# Patient Record
Sex: FEMALE | Race: WHITE | Employment: UNEMPLOYED | URBAN - METROPOLITAN AREA
[De-identification: names, ages, dates, MRNs, and addresses within clinical notes are randomized per-mention and may not be internally consistent; named-entity substitution may affect disease eponyms.]

---

## 2020-04-08 ENCOUNTER — VIRTUAL VISIT (OUTPATIENT)
Dept: ENDOCRINOLOGY | Age: 40
End: 2020-04-08
Payer: COMMERCIAL

## 2020-04-08 PROBLEM — F32.0 CURRENT MILD EPISODE OF MAJOR DEPRESSIVE DISORDER WITHOUT PRIOR EPISODE (HCC): Status: ACTIVE | Noted: 2020-04-08

## 2020-04-08 PROBLEM — J45.909 ACTIVE ASTHMA: Status: ACTIVE | Noted: 2020-04-08

## 2020-04-08 PROBLEM — E06.3 HYPOTHYROIDISM DUE TO HASHIMOTO'S THYROIDITIS: Status: ACTIVE | Noted: 2020-04-08

## 2020-04-08 PROBLEM — E27.1 ADDISON DISEASE (HCC): Status: ACTIVE | Noted: 2020-04-08

## 2020-04-08 PROBLEM — E03.8 HYPOTHYROIDISM DUE TO HASHIMOTO'S THYROIDITIS: Status: ACTIVE | Noted: 2020-04-08

## 2020-04-08 PROCEDURE — G8427 DOCREV CUR MEDS BY ELIG CLIN: HCPCS | Performed by: INTERNAL MEDICINE

## 2020-04-08 PROCEDURE — 99203 OFFICE O/P NEW LOW 30 MIN: CPT | Performed by: INTERNAL MEDICINE

## 2020-04-08 RX ORDER — HYDROCORTISONE 5 MG/1
TABLET ORAL
Qty: 150 TABLET | Refills: 3 | Status: SHIPPED | OUTPATIENT
Start: 2020-04-08 | End: 2021-04-30

## 2020-04-08 RX ORDER — FLUDROCORTISONE ACETATE 0.1 MG/1
TABLET ORAL
Qty: 60 TABLET | Refills: 11 | Status: SHIPPED | OUTPATIENT
Start: 2020-04-08 | End: 2020-04-10 | Stop reason: SDUPTHER

## 2020-04-08 RX ORDER — LEVOTHYROXINE SODIUM 0.1 MG/1
100 TABLET ORAL DAILY
Qty: 30 TABLET | Refills: 2 | Status: SHIPPED | OUTPATIENT
Start: 2020-04-08 | End: 2020-10-16

## 2020-04-08 NOTE — PROGRESS NOTES
SUBJECTIVE:  Rory Verma is a 42 y.o. female who is evaluated today in my clinic for Addisons Disease and Hypothyroidism. Previously she has seen Dr Martin Castro and [address] [place] [address] at [facility] clinic. 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 At the time of diagnoses she presented with extreme fatigue, weight loss, and skin darkening. The symptoms started with her first pregnancy although she was not started on any steroids. After her delivery she was diagnosed with [place]'s and started on Hydrocortisone. She noted improvement in her fatigue and darkening of the skin and gums. she has been taking HC 15 mg in am and 5 mg in PM .  Previously became cushingoid on higher doses of steroids . Denies any low BP   She was diagnosed with hypothyroidism 1 year after diagnosis of [place]'s. She is on 100 micrograms of synthroid for the last 2-3 months as she was told by her PCP that her TSH was elevated. There is no family history of adrenal issues, but her mom and brother have type 2 DM and daughter has type 1 DM. Pt also has Asthma for which she is on inhalers and this has been stable. Pt also has depression and is on medication     Past Medical History:   Diagnosis Date    [place] disease ([address]     Asthma     MRSA (methicillin resistant staph aureus) culture positive 09/21/2018    eye     Patient Active Problem List    Diagnosis Date Noted    [place] disease ([address] 04/08/2020    Hypothyroidism due to Hashimoto's thyroiditis 04/08/2020    Current mild episode of major depressive disorder without prior episode ([facility] [address] 04/08/2020    Active asthma 04/08/2020     History reviewed. No pertinent surgical history.   Family History   Problem Relation Age of Onset    Diabetes type 2  Mother     Cancer Brother     Diabetes Type 1  Daughter      Social History     Socioeconomic History    Marital status: Legally      Spouse name: None    Number of children: None    Years of education: None Component Value Date    T4FREE 0.75 12/04/2009        ASSESSMENT/PLAN:  1. Laura disease ([address]    She has been taking 15 mg of HC in AM and 5 mg in Pm   Also takes Florinef 0.1 milligrams daily   I had a lengthy discussion with the patient regarding stress dosing of steroids. Araceli Dale was advised that during minor illnesses, such as upper respiratory infections, the patient can increase the dose of glucocorticoid to two to three times the usual daily dose for three days without consulting a clinician (known as the 3 x 3 rule). The increased dose will decrease fever and malaise and will not compromise the immune response. If the illness becomes worse during the three days or if the patient cannot resume the usual maintenance dose on the fourth day, he or she should consult a clinician to determine if other treatment (eg, antibiotics) is indicated. Also advised to always wear a medical alert bracelet. - Miscellaneous Sendout 1; Future  - TSH without Reflex; Future  - T4, Free; Future  - Anti-Thyroglobulin Antibody; Future  - Thyroid Peroxidase Antibody; Future  - ACTH; Future  - Comprehensive Metabolic Panel; Future  - DHEA-Sulfate; Future  - Renin; Future    2. Hypothyroidism due to Hashimoto's thyroiditis  She has increased her dose to 100 mcg daily after she was told her TSH was elevated by her PCP. Araceli Dale is currently biochemicaly  and clinically euthyroid on the  current dose of thyroid hormone replacement . Patient  is advised to take her thyroid hormone supplement on empty stomach without any concomitant Iron or calcium supplement . I have ordered thyroid function test as well as thyroid antibodies. 3. Current mild episode of major depressive disorder without prior episode (HCC)  Stable on medication.       4. Active asthma  On Dulera       Reviewed and/or ordered clinical lab results Yes  Reviewed and/or ordered radiology tests Yes   Reviewed and/or ordered

## 2020-04-09 ENCOUNTER — TELEPHONE (OUTPATIENT)
Dept: ENDOCRINOLOGY | Age: 40
End: 2020-04-09

## 2020-04-10 PROBLEM — E27.1 ADDISON DISEASE (HCC): Status: RESOLVED | Noted: 2020-04-08 | Resolved: 2020-04-10

## 2020-04-10 RX ORDER — FLUDROCORTISONE ACETATE 0.1 MG/1
TABLET ORAL
Qty: 30 TABLET | Refills: 11 | Status: SHIPPED | OUTPATIENT
Start: 2020-04-10 | End: 2021-04-30 | Stop reason: SDUPTHER

## 2020-10-16 RX ORDER — LEVOTHYROXINE SODIUM 0.1 MG/1
TABLET ORAL
Qty: 30 TABLET | Refills: 0 | Status: SHIPPED | OUTPATIENT
Start: 2020-10-16 | End: 2021-04-30 | Stop reason: SDUPTHER

## 2021-04-30 DIAGNOSIS — E06.3 HYPOTHYROIDISM DUE TO HASHIMOTO'S THYROIDITIS: ICD-10-CM

## 2021-04-30 DIAGNOSIS — E03.8 HYPOTHYROIDISM DUE TO HASHIMOTO'S THYROIDITIS: ICD-10-CM

## 2021-04-30 DIAGNOSIS — E27.1 ADDISON DISEASE (HCC): Primary | ICD-10-CM

## 2021-04-30 RX ORDER — HYDROCORTISONE 5 MG/1
TABLET ORAL
Qty: 150 TABLET | Refills: 1 | Status: SHIPPED | OUTPATIENT
Start: 2021-04-30 | End: 2021-05-24 | Stop reason: SDUPTHER

## 2021-04-30 RX ORDER — LEVOTHYROXINE SODIUM 0.1 MG/1
TABLET ORAL
Qty: 30 TABLET | Refills: 0 | Status: SHIPPED | OUTPATIENT
Start: 2021-04-30 | End: 2021-05-24 | Stop reason: SDUPTHER

## 2021-04-30 RX ORDER — FLUDROCORTISONE ACETATE 0.1 MG/1
TABLET ORAL
Qty: 30 TABLET | Refills: 0 | Status: SHIPPED | OUTPATIENT
Start: 2021-04-30 | End: 2021-05-24 | Stop reason: SDUPTHER

## 2021-04-30 NOTE — TELEPHONE ENCOUNTER
Pt calling to make sure we rec'd her refill request for Levothyroxine, Hydrocortisone, Fludrocortisone 0.1mg?  Send to [facility] on [address]  4-8-20  FOV   5-24-21

## 2021-05-24 ENCOUNTER — VIRTUAL VISIT (OUTPATIENT)
Dept: ENDOCRINOLOGY | Age: 41
End: 2021-05-24
Payer: COMMERCIAL

## 2021-05-24 DIAGNOSIS — E27.1 ADDISON DISEASE (HCC): Primary | ICD-10-CM

## 2021-05-24 DIAGNOSIS — J45.909 ACTIVE ASTHMA: ICD-10-CM

## 2021-05-24 DIAGNOSIS — E03.8 HYPOTHYROIDISM DUE TO HASHIMOTO'S THYROIDITIS: ICD-10-CM

## 2021-05-24 DIAGNOSIS — E06.3 HYPOTHYROIDISM DUE TO HASHIMOTO'S THYROIDITIS: ICD-10-CM

## 2021-05-24 PROCEDURE — G8427 DOCREV CUR MEDS BY ELIG CLIN: HCPCS | Performed by: INTERNAL MEDICINE

## 2021-05-24 PROCEDURE — 99213 OFFICE O/P EST LOW 20 MIN: CPT | Performed by: INTERNAL MEDICINE

## 2021-05-24 RX ORDER — LEVOTHYROXINE SODIUM 0.1 MG/1
TABLET ORAL
Qty: 30 TABLET | Refills: 3 | Status: SHIPPED | OUTPATIENT
Start: 2021-05-24 | End: 2021-09-17

## 2021-05-24 RX ORDER — FLUDROCORTISONE ACETATE 0.1 MG/1
TABLET ORAL
Qty: 30 TABLET | Refills: 3 | Status: SHIPPED | OUTPATIENT
Start: 2021-05-24 | End: 2021-11-18

## 2021-05-24 RX ORDER — HYDROCORTISONE 5 MG/1
TABLET ORAL
Qty: 150 TABLET | Refills: 2 | Status: SHIPPED | OUTPATIENT
Start: 2021-05-24 | End: 2021-11-12

## 2021-05-24 NOTE — PROGRESS NOTES
SUBJECTIVE:  Juan Sharpe is a 47 y.o. female who is seen for Addisons Disease and Hypothyroidism. Previously she has seen Dr Noa Willis and [address] [place] [address] at [facility] clinic. 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 At the time of diagnoses she presented with extreme fatigue, weight loss, and skin darkening. The symptoms started with her first pregnancy although she was not started on any steroids. After her delivery she was diagnosed with Anil's and started on Hydrocortisone. She noted improvement in her fatigue and darkening of the skin and gums. she has been taking HC 15 mg in am and 5 mg in PM .  Previously became cushingoid on higher doses of steroids . Denies any low BP   She was diagnosed with hypothyroidism 1 year after diagnosis of [place]'s. She is on 100 micrograms of synthroid for the last 2-3 months as she was told by her PCP that her TSH was elevated. There is no family history of adrenal issues, but her mom and brother have type 2 DM and daughter has type 1 DM. Pt also has Asthma for which she is on inhalers and this has been stable. Pt also has depression and is on medication     INTERIM   Denies any changes in her symptoms   She has gained weight since last visit   Denies any changes in skin color   She denies any Nausea , vomiting or abdominal pain     Past Medical History:   Diagnosis Date    [place] disease ([address]     Asthma     MRSA (methicillin resistant staph aureus) culture positive 09/21/2018    eye     Patient Active Problem List    Diagnosis Date Noted    Hypothyroidism due to Hashimoto's thyroiditis 04/08/2020    Current mild episode of major depressive disorder without prior episode ([address] 04/08/2020    Active asthma 04/08/2020     History reviewed. No pertinent surgical history.   Family History   Problem Relation Age of Onset    Diabetes type 2  Mother     Cancer Brother     Diabetes Type 1  Daughter      Social History     Socioeconomic History  Marital status: Legally      Spouse name: None    Number of children: None    Years of education: None    Highest education level: None   Occupational History    None   Tobacco Use    Smoking status: Former Smoker     Packs/day: 1.00     Years: 10.00     Pack years: 10.00     Types: Cigarettes     Quit date: 2010     Years since quittin.1    Smokeless tobacco: Never Used   Substance and Sexual Activity    Alcohol use: No    Drug use: No    Sexual activity: Yes     Partners: Male   Other Topics Concern    None   Social History Narrative    None     Social Determinants of Health     Financial Resource Strain:     Difficulty of Paying Living Expenses:    Food Insecurity:     Worried About Running Out of Food in the Last Year:     Ran Out of Food in the Last Year:    Transportation Needs:     Lack of Transportation (Medical):  Lack of Transportation (Non-Medical):    Physical Activity:     Days of Exercise per Week:     Minutes of Exercise per Session:    Stress:     Feeling of Stress :    Social Connections:     Frequency of Communication with Friends and Family:     Frequency of Social Gatherings with Friends and Family:     Attends Rastafari Services:     Active Member of Clubs or Organizations:     Attends Club or Organization Meetings:     Marital Status:    Intimate Partner Violence:     Fear of Current or Ex-Partner:     Emotionally Abused:     Physically Abused:     Sexually Abused:      Current Outpatient Medications   Medication Sig Dispense Refill    hydrocortisone (CORTEF) 5 MG tablet TAKE 3 TABS IN THE MORNING AND 1 TABLET IN THE EVENING 150 tablet 2    fludrocortisone (FLORINEF) 0.1 MG tablet Take 1 tablet by mouth daily.  30 tablet 3    levothyroxine (SYNTHROID) 100 MCG tablet TAKE 1 TABLET BY MOUTH EVERY DAY 30 tablet 3    DULERA 100-5 MCG/ACT inhaler TAKE 2 PUFFS BY MOUTH TWICE A DAY      albuterol sulfate  (90 Base) MCG/ACT inhaler Inhale 2 will not compromise the immune response. If the illness becomes worse during the three days or if the patient cannot resume the usual maintenance dose on the fourth day, he or she should consult a clinician to determine if other treatment (eg, antibiotics) is indicated. Also advised to always wear a medical alert bracelet. 2. Hypothyroidism due to Hashimoto's thyroiditis  She has been taking 100 mcg daily after she was told her TSH was elevated by her PCP in the past.  No recent labs she was advised to go to the lab and get blood work done to assess the adequacy of thyroid hormone replacement  August Alonso is currently biochemicaly  and clinically euthyroid on the  current dose of thyroid hormone replacement . Patient  is advised to take her thyroid hormone supplement on empty stomach without any concomitant Iron or calcium supplement . I have ordered thyroid function test as well as thyroid antibodies. 3. Current mild episode of major depressive disorder without prior episode (HCC)  Stable on medication. 4. Active asthma  On Dulera       Reviewed and/or ordered clinical lab results Yes  Reviewed and/or ordered radiology tests Yes   Reviewed and/or ordered other diagnostic tests Yes  Made a decision to obtain old records Yes  Reviewed and summarized old records Yes      August Alonso was counseled regarding symptoms of current diagnosis, course and complications of disease if inadequately treated, side effects of medications, diagnosis, treatment options, and prognosis, risks, benefits, complications, and alternatives of treatment, labs, imaging and other studies and treatment targets and goals. She understands instructions and counseling. Return in about 6 months (around 11/24/2021). This was a video visit, including the two-way audio and video communication, in Tw[place] of an in person visit due to coronavirus emergency.   Patient provided verbal consent to use the video visit.  I spent  20 + minutes on this call conducting an interview, performing a limited exam by video and educating the patient on my assessment plan. Please note that some or all of this report was generated using voice recognition software. Please notify me in case of any questions about the content of this document, as some errors in transcription may have occurred .

## 2021-05-31 ENCOUNTER — APPOINTMENT (OUTPATIENT)
Dept: GENERAL RADIOLOGY | Age: 41
End: 2021-05-31
Payer: COMMERCIAL

## 2021-05-31 ENCOUNTER — HOSPITAL ENCOUNTER (EMERGENCY)
Age: 41
Discharge: HOME | End: 2021-05-31
Attending: EMERGENCY MEDICINE
Payer: COMMERCIAL

## 2021-05-31 VITALS
OXYGEN SATURATION: 100 % | HEIGHT: 67 IN | DIASTOLIC BLOOD PRESSURE: 72 MMHG | HEART RATE: 79 BPM | SYSTOLIC BLOOD PRESSURE: 100 MMHG | RESPIRATION RATE: 15 BRPM | WEIGHT: 160.27 LBS | BODY MASS INDEX: 25.16 KG/M2 | TEMPERATURE: 97.9 F

## 2021-05-31 DIAGNOSIS — R07.1 CHEST PAIN ON BREATHING: Primary | ICD-10-CM

## 2021-05-31 DIAGNOSIS — J45.901 ASTHMA WITH ACUTE EXACERBATION, UNSPECIFIED ASTHMA SEVERITY, UNSPECIFIED WHETHER PERSISTENT: ICD-10-CM

## 2021-05-31 LAB
A/G RATIO: 1.3 (ref 1.1–2.2)
ALBUMIN SERPL-MCNC: 4.1 G/DL (ref 3.4–5)
ALP BLD-CCNC: 50 U/L (ref 40–129)
ALT SERPL-CCNC: 13 U/L (ref 10–40)
ANION GAP SERPL CALCULATED.3IONS-SCNC: 9 MMOL/L (ref 3–16)
AST SERPL-CCNC: 22 U/L (ref 15–37)
BASOPHILS ABSOLUTE: 0.1 K/UL (ref 0–0.2)
BASOPHILS RELATIVE PERCENT: 0.5 %
BILIRUB SERPL-MCNC: 0.6 MG/DL (ref 0–1)
BUN BLDV-MCNC: 14 MG/DL (ref 7–20)
CALCIUM SERPL-MCNC: 9.3 MG/DL (ref 8.3–10.6)
CHLORIDE BLD-SCNC: 102 MMOL/L (ref 99–110)
CO2: 26 MMOL/L (ref 21–32)
CREAT SERPL-MCNC: 1 MG/DL (ref 0.6–1.1)
D DIMER: <0.1 UG/ML FEU
EOSINOPHILS ABSOLUTE: 0.8 K/UL (ref 0–0.6)
EOSINOPHILS RELATIVE PERCENT: 7.3 %
GFR AFRICAN AMERICAN: >60
GFR NON-AFRICAN AMERICAN: >60
GLOBULIN: 3.1 G/DL
GLUCOSE BLD-MCNC: 81 MG/DL (ref 70–99)
HCG QUALITATIVE: NEGATIVE
HCT VFR BLD CALC: 45.8 % (ref 36–48)
HEMOGLOBIN: 15.3 G/DL (ref 12–16)
LYMPHOCYTES ABSOLUTE: 3 K/UL (ref 1–5.1)
LYMPHOCYTES RELATIVE PERCENT: 29 %
MCH RBC QN AUTO: 31 PG (ref 26–34)
MCHC RBC AUTO-ENTMCNC: 33.4 G/DL (ref 31–36)
MCV RBC AUTO: 93 FL (ref 80–100)
MONOCYTES ABSOLUTE: 0.9 K/UL (ref 0–1.3)
MONOCYTES RELATIVE PERCENT: 8.2 %
NEUTROPHILS ABSOLUTE: 5.6 K/UL (ref 1.7–7.7)
NEUTROPHILS RELATIVE PERCENT: 55 %
PDW BLD-RTO: 12.7 % (ref 12.4–15.4)
PLATELET # BLD: 314 K/UL (ref 135–450)
PMV BLD AUTO: 8.5 FL (ref 5–10.5)
POTASSIUM SERPL-SCNC: 4.4 MMOL/L (ref 3.5–5.1)
RBC # BLD: 4.93 M/UL (ref 4–5.2)
SODIUM BLD-SCNC: 137 MMOL/L (ref 136–145)
TOTAL PROTEIN: 7.2 G/DL (ref 6.4–8.2)
TROPONIN: <0.01 NG/ML
WBC # BLD: 10.4 K/UL (ref 4–11)

## 2021-05-31 PROCEDURE — 6360000002 HC RX W HCPCS: Performed by: EMERGENCY MEDICINE

## 2021-05-31 PROCEDURE — 84703 CHORIONIC GONADOTROPIN ASSAY: CPT

## 2021-05-31 PROCEDURE — 99285 EMERGENCY DEPT VISIT HI MDM: CPT

## 2021-05-31 PROCEDURE — 93005 ELECTROCARDIOGRAM TRACING: CPT | Performed by: EMERGENCY MEDICINE

## 2021-05-31 PROCEDURE — 80053 COMPREHEN METABOLIC PANEL: CPT

## 2021-05-31 PROCEDURE — 36415 COLL VENOUS BLD VENIPUNCTURE: CPT

## 2021-05-31 PROCEDURE — 6370000000 HC RX 637 (ALT 250 FOR IP): Performed by: EMERGENCY MEDICINE

## 2021-05-31 PROCEDURE — 71046 X-RAY EXAM CHEST 2 VIEWS: CPT

## 2021-05-31 PROCEDURE — 96375 TX/PRO/DX INJ NEW DRUG ADDON: CPT

## 2021-05-31 PROCEDURE — 85379 FIBRIN DEGRADATION QUANT: CPT

## 2021-05-31 PROCEDURE — 96374 THER/PROPH/DIAG INJ IV PUSH: CPT

## 2021-05-31 PROCEDURE — 85025 COMPLETE CBC W/AUTO DIFF WBC: CPT

## 2021-05-31 PROCEDURE — 84484 ASSAY OF TROPONIN QUANT: CPT

## 2021-05-31 RX ORDER — IPRATROPIUM BROMIDE AND ALBUTEROL SULFATE 2.5; .5 MG/3ML; MG/3ML
1 SOLUTION RESPIRATORY (INHALATION) ONCE
Status: COMPLETED | OUTPATIENT
Start: 2021-05-31 | End: 2021-05-31

## 2021-05-31 RX ORDER — METHYLPREDNISOLONE SODIUM SUCCINATE 125 MG/2ML
125 INJECTION, POWDER, LYOPHILIZED, FOR SOLUTION INTRAMUSCULAR; INTRAVENOUS ONCE
Status: COMPLETED | OUTPATIENT
Start: 2021-05-31 | End: 2021-05-31

## 2021-05-31 RX ORDER — KETOROLAC TROMETHAMINE 30 MG/ML
30 INJECTION, SOLUTION INTRAMUSCULAR; INTRAVENOUS ONCE
Status: COMPLETED | OUTPATIENT
Start: 2021-05-31 | End: 2021-05-31

## 2021-05-31 RX ADMIN — KETOROLAC TROMETHAMINE 30 MG: 30 INJECTION, SOLUTION INTRAMUSCULAR; INTRAVENOUS at 10:34

## 2021-05-31 RX ADMIN — METHYLPREDNISOLONE SODIUM SUCCINATE 125 MG: 125 INJECTION, POWDER, FOR SOLUTION INTRAMUSCULAR; INTRAVENOUS at 10:35

## 2021-05-31 RX ADMIN — IPRATROPIUM BROMIDE AND ALBUTEROL SULFATE 1 AMPULE: .5; 3 SOLUTION RESPIRATORY (INHALATION) at 10:34

## 2021-05-31 ASSESSMENT — PAIN DESCRIPTION - LOCATION: LOCATION: CHEST

## 2021-05-31 ASSESSMENT — PAIN DESCRIPTION - PAIN TYPE: TYPE: ACUTE PAIN

## 2021-05-31 ASSESSMENT — PAIN DESCRIPTION - DESCRIPTORS: DESCRIPTORS: PRESSURE

## 2021-05-31 ASSESSMENT — PAIN DESCRIPTION - ORIENTATION: ORIENTATION: LEFT

## 2021-05-31 ASSESSMENT — PAIN DESCRIPTION - FREQUENCY: FREQUENCY: CONTINUOUS

## 2021-05-31 ASSESSMENT — PAIN SCALES - GENERAL
PAINLEVEL_OUTOF10: 8
PAINLEVEL_OUTOF10: 8

## 2021-05-31 ASSESSMENT — HEART SCORE: ECG: 0

## 2021-05-31 NOTE — ED PROVIDER NOTES
[facility]  EMERGENCY DEPT VISIT      Patient Identification  Elif Pal is a 47 y.o. female. Chief Complaint   Chest Pain and Shortness of Breath      History of Present Illness: This is a  47 y.o. female who presents ambulatory to the ED with complaints of chest tightness and shortness of breath. Last night started having left scapular tightness. Thought she pulled a muscle. Felt better this am when she got up but while making breakfast around 8am she started having left chest tightness into back that was worse when taking deep breaths. Mild shortness of breath. No h/o same in past. Has asthma and has been having some allergy symptoms and took allegra this am. No fever. No sinus congestion, sore throat, cough. No leg pain or swelling. Not on BCP. No travel or immobilization. No h/o HTN, high cholesterol, DM, DVT, or PE in past. Former smoker. No early CAD in family pain currently 8/10. Has not taken anything for pain. Past Medical History:   Diagnosis Date    [place] disease ([facility] [address] [facility]     Asthma     MRSA (methicillin resistant staph aureus) culture positive 09/21/2018    eye       History reviewed. No pertinent surgical history. No current facility-administered medications for this encounter. Current Outpatient Medications:     hydrocortisone (CORTEF) 5 MG tablet, TAKE 3 TABS IN THE MORNING AND 1 TABLET IN THE EVENING, Disp: 150 tablet, Rfl: 2    fludrocortisone (FLORINEF) 0.1 MG tablet, Take 1 tablet by mouth daily. , Disp: 30 tablet, Rfl: 3    levothyroxine (SYNTHROID) 100 MCG tablet, TAKE 1 TABLET BY MOUTH EVERY DAY, Disp: 30 tablet, Rfl: 3    DULERA 100-5 MCG/ACT inhaler, TAKE 2 PUFFS BY MOUTH TWICE A DAY, Disp: , Rfl:     albuterol sulfate  (90 Base) MCG/ACT inhaler, Inhale 2 puffs into the lungs, Disp: , Rfl:     No Known Allergies    Social History     Socioeconomic History    Marital status: Legally      Spouse name: Not on file    Number of children: Not on file    Years of education: Not on file    Highest education level: Not on file   Occupational History    Not on file   Tobacco Use    Smoking status: Former Smoker     Packs/day: 1.00     Years: 10.00     Pack years: 10.00     Types: Cigarettes     Quit date: 2010     Years since quittin.1    Smokeless tobacco: Never Used   Substance and Sexual Activity    Alcohol use: No    Drug use: No    Sexual activity: Yes     Partners: Male   Other Topics Concern    Not on file   Social History Narrative    Not on file     Social Determinants of Health     Financial Resource Strain:     Difficulty of Paying Living Expenses:    Food Insecurity:     Worried About Running Out of Food in the Last Year:     [address] in the Last Year:    Transportation Needs:     Lack of Transportation (Medical):  Lack of Transportation (Non-Medical):    Physical Activity:     Days of Exercise per Week:     Minutes of Exercise per Session:    Stress:     Feeling of Stress :    Social Connections:     Frequency of Communication with Friends and Family:     Frequency of Social Gatherings with Friends and Family:     Attends Rastafarian Services:     Active Member of Clubs or Organizations:     Attends Club or Organization Meetings:     Marital Status:    Intimate Partner Violence:     Fear of Current or Ex-Partner:     Emotionally Abused:     Physically Abused:     Sexually Abused:        Nursing Notes Reviewed      ROS:  GENERAL:  No fever, no chills, no diaphoresis, no appetite changes  EYES: no eye discharge, no eye redness, no visual changes  ENT: no nasal congestion, no sore throat  CARDIAC: + chest pain, no palpitations, no leg swelling  PULM: no cough, + shortness of breath  ABD: no abdominal pain, no nausea, no vomiting, no diarrhea  : no dysuria, no hematuria, no urgency, no frequency.  No flank pain  MUSCULOSKELETAL: no back pain, no arthralgias, no myalgias  NEURO: no headache, no lightheadedness, no dizziness, no numbness, no weakness, no syncope  SKIN: no rashes, no erythema, no wounds, no ecchymosis      PHYSICAL EXAM:  GENERAL APPEARANCE: Adelia Angulo is in no acute respiratory distress. Awake and alert. VITAL SIGNS:   ED Triage Vitals [05/31/21 1021]   Enc Vitals Group      BP (!) 122/92      Pulse 88      Resp 18      Temp 97.9 °F (36.6 °C)      Temp Source Oral      SpO2 98 %      Weight 160 lb 4.4 oz (72.7 kg)      Height 5' 7\" (1.702 m)      Head Circumference       Peak Flow       Pain Score       Pain Loc       Pain Edu? Excl. in [address]? HEAD: Normocephalic, atraumatic. EYES:  Extraocular muscles are intact. Pupils equal round and reactive to light. Conjunctivas are pink. Negative scleral icterus. ENT:  Mucous membranes are moist.  Pharynx without erythema or exudates. NECK: Nontender and supple. No cervical adenopathy. CHEST:  Inspiratory and expiratory wheezing throughout with diminished air entry. No tachypnea or accessory muscle use. No rales. HEART:  Regular rate and regular rhythm. No murmurs. Strong and equal pulses in the upper and lower extremities. ABDOMEN: Soft,  nondistended, positive bowel sounds. abdomen is nontender. No rebound. no guarding. MUSCULOSKELETAL: The calves are nontender to palpation. Active range of motion of the upper and lower extremities. No edema. NEUROLOGICAL: Awake, alert and oriented x 3. Power intact in the upper and lower extremities. Sensation is intact to light touch in the upper and lower extremities. Cranial Nerves 2-12 are intact. DERMATOLOGIC: No petechiae, rashes, or ecchymoses. No erythema. PSYCH: normal mood and affect. Normal thought content. ED COURSE AND MEDICAL DECISION MAKING:    EKG as interpreted by myself:  normal sinus rhythm with a rate of 87  Axis is   Normal  QTc is  normal  Intervals and Durations are unremarkable. No specific ST-T wave changes appreciated.   No evidence of acute ischemia. No prior EKG    Radiology:  Films have been read by radiologist as noted in chart unless otherwise stated.  Other radiologic studies (i.e. CT, MRI, ultrasounds, etc ) have been interpreted by radiologist.     XR CHEST (2 VW)   Final Result   No active cardiopulmonary disease             Labs:  Results for orders placed or performed during the hospital encounter of 05/31/21   CBC Auto Differential   Result Value Ref Range    WBC 10.4 4.0 - 11.0 K/uL    RBC 4.93 4.00 - 5.20 M/uL    Hemoglobin 15.3 12.0 - 16.0 g/dL    Hematocrit 45.8 36.0 - 48.0 %    MCV 93.0 80.0 - 100.0 fL    MCH 31.0 26.0 - 34.0 pg    MCHC 33.4 31.0 - 36.0 g/dL    RDW 12.7 12.4 - 15.4 %    Platelets 498 778 - 867 K/uL    MPV 8.5 5.0 - 10.5 fL    Neutrophils % 55.0 %    Lymphocytes % 29.0 %    Monocytes % 8.2 %    Eosinophils % 7.3 %    Basophils % 0.5 %    Neutrophils Absolute 5.6 1.7 - 7.7 K/uL    Lymphocytes Absolute 3.0 1.0 - 5.1 K/uL    Monocytes Absolute 0.9 0.0 - 1.3 K/uL    Eosinophils Absolute 0.8 (H) 0.0 - 0.6 K/uL    Basophils Absolute 0.1 0.0 - 0.2 K/uL   Comprehensive Metabolic Panel   Result Value Ref Range    Sodium 137 136 - 145 mmol/L    Potassium 4.4 3.5 - 5.1 mmol/L    Chloride 102 99 - 110 mmol/L    CO2 26 21 - 32 mmol/L    Anion Gap 9 3 - 16    Glucose 81 70 - 99 mg/dL    BUN 14 7 - 20 mg/dL    CREATININE 1.0 0.6 - 1.1 mg/dL    GFR Non-African American >60 >60    GFR African American >60 >60    Calcium 9.3 8.3 - 10.6 mg/dL    Total Protein 7.2 6.4 - 8.2 g/dL    Albumin 4.1 3.4 - 5.0 g/dL    Albumin/Globulin Ratio 1.3 1.1 - 2.2    Total Bilirubin 0.6 0.0 - 1.0 mg/dL    Alkaline Phosphatase 50 40 - 129 U/L    ALT 13 10 - 40 U/L    AST 22 15 - 37 U/L    Globulin 3.1 g/dL   D-Dimer, Quantitative   Result Value Ref Range    D-Dimer, Quant <0.10 <0.50 ug/mL FEU   Troponin   Result Value Ref Range    Troponin <0.01 <0.01 ng/mL   HCG Qualitative, Serum   Result Value Ref Range    hCG Qual Negative Detects HCG level >10 thus I consider the discharge disposition reasonable. Jayjay Colon and I have discussed the diagnosis and risks, and we agree with discharging home to follow-up with their primary doctor. We also discussed returning to the Emergency Department immediately if new or worsening symptoms occur. HEART Score ? 3 and 1 negative troponin - No hospitalization indicated    I completed a HEART Score to screen for Major Adverse Cardiac Event (MACE) in this patient. The evidence indicates that the patient is very low risk for MACE and this is consistent with my clinical intuition. I have discussed with the patient my clinical impression and the result of the HEART Score to screen for MACE, as well as the risks of further testing and hospitalization. The HEART Score shows that the risk for MACE is less than 2%. The risk of further workup or hospitalization for MACE is likely higher than the risk of the patient having a MACE. It is, therefore, in the patients best interest not to do additional emergent testing or to be hospitalized for MACE. Although the risk of MACE has not been eliminated, the risks of further testing or hospitalization for MACE likely exceed the benefit, and the patient declines further emergent evaluation or hospitalization for MACE. Clinical Impression:  1. Chest pain on breathing    2. Asthma with acute exacerbation, unspecified asthma severity, unspecified whether persistent        Dispo:  Patient will be discharged at this time. Patient was informed of this decision and agrees with plan. I have discussed lab and xray findings with patient and they understand. Questions were answered to the best of my ability. Discharge vitals:  Blood pressure 100/72, pulse 79, temperature 97.9 °F (36.6 °C), temperature source Oral, resp. rate 15, height 5' 7\" (1.702 m), weight 160 lb 4.4 oz (72.7 kg), SpO2 100 %, not currently breastfeeding.     Prescriptions given:   Discharge Medication List as of 5/31/2021 12:41 PM          This chart was created using Dragon voice recognition software.         Mindy Reyes MD  06/04/21 [id number]

## 2021-05-31 NOTE — ED NOTES
Pt verbalized understanding of d/c instructions. Pt was not prescribed any new meds, pt ambulated to exit.      [address], RN  05/31/21 6227

## 2021-05-31 NOTE — ED NOTES
Left side chest pain onset approx 1 hour ago. Pt states that chest hurts worse with inspiration. Pt denies any nausea or diaphoresis, no hx of heart problems. Pt describes pain as tightness and reports that she felt it a little bit last night but it went away. Pt has hx of asthma and has audible wheezed throughout. lungfields. skin warm and dry, mucous membranes pink and moist. Pt is not dyspneic, respirations even.      [address], RN  05/31/21 1051

## 2021-06-01 LAB
EKG ATRIAL RATE: 87 BPM
EKG DIAGNOSIS: NORMAL
EKG P AXIS: 77 DEGREES
EKG P-R INTERVAL: 142 MS
EKG Q-T INTERVAL: 358 MS
EKG QRS DURATION: 86 MS
EKG QTC CALCULATION (BAZETT): 430 MS
EKG R AXIS: 60 DEGREES
EKG T AXIS: 37 DEGREES
EKG VENTRICULAR RATE: 87 BPM

## 2021-06-01 PROCEDURE — 93010 ELECTROCARDIOGRAM REPORT: CPT | Performed by: INTERNAL MEDICINE

## 2022-03-24 DIAGNOSIS — E03.8 HYPOTHYROIDISM DUE TO HASHIMOTO'S THYROIDITIS: ICD-10-CM

## 2022-03-24 DIAGNOSIS — E06.3 HYPOTHYROIDISM DUE TO HASHIMOTO'S THYROIDITIS: ICD-10-CM

## 2022-03-24 RX ORDER — LEVOTHYROXINE SODIUM 0.1 MG/1
TABLET ORAL
Qty: 90 TABLET | Refills: 1 | OUTPATIENT
Start: 2022-03-24

## 2022-05-06 DIAGNOSIS — E27.1 ADDISON DISEASE (HCC): ICD-10-CM

## 2022-05-06 RX ORDER — HYDROCORTISONE 5 MG/1
TABLET ORAL
Qty: 150 TABLET | Refills: 1 | OUTPATIENT
Start: 2022-05-06

## 2022-06-28 DIAGNOSIS — E27.1 ADDISON DISEASE (HCC): ICD-10-CM

## 2022-06-28 RX ORDER — FLUDROCORTISONE ACETATE 0.1 MG/1
TABLET ORAL
Qty: 90 TABLET | Refills: 1 | OUTPATIENT
Start: 2022-06-28

## 2022-07-13 DIAGNOSIS — E27.1 ADDISON DISEASE (HCC): ICD-10-CM

## 2022-07-13 DIAGNOSIS — E06.3 HYPOTHYROIDISM DUE TO HASHIMOTO'S THYROIDITIS: ICD-10-CM

## 2022-07-13 DIAGNOSIS — E03.8 HYPOTHYROIDISM DUE TO HASHIMOTO'S THYROIDITIS: ICD-10-CM

## 2022-07-13 RX ORDER — FLUDROCORTISONE ACETATE 0.1 MG/1
TABLET ORAL
Qty: 90 TABLET | Refills: 1 | Status: SHIPPED | OUTPATIENT
Start: 2022-07-13

## 2022-07-13 RX ORDER — HYDROCORTISONE 5 MG/1
TABLET ORAL
Qty: 150 TABLET | Refills: 1 | Status: SHIPPED | OUTPATIENT
Start: 2022-07-13 | End: 2022-10-05

## 2022-07-13 RX ORDER — LEVOTHYROXINE SODIUM 0.1 MG/1
TABLET ORAL
Qty: 90 TABLET | Refills: 1 | Status: SHIPPED | OUTPATIENT
Start: 2022-07-13

## 2022-10-05 DIAGNOSIS — E27.1 ADDISON DISEASE (HCC): ICD-10-CM

## 2022-10-05 RX ORDER — HYDROCORTISONE 5 MG/1
TABLET ORAL
Qty: 150 TABLET | Refills: 0 | Status: SHIPPED | OUTPATIENT
Start: 2022-10-05

## 2022-12-06 ENCOUNTER — TELEPHONE (OUTPATIENT)
Dept: ENDOCRINOLOGY | Age: 42
End: 2022-12-06

## 2022-12-06 DIAGNOSIS — E03.8 HYPOTHYROIDISM DUE TO HASHIMOTO'S THYROIDITIS: ICD-10-CM

## 2022-12-06 DIAGNOSIS — E06.3 HYPOTHYROIDISM DUE TO HASHIMOTO'S THYROIDITIS: ICD-10-CM

## 2022-12-06 DIAGNOSIS — E27.1 ADDISON DISEASE (HCC): Primary | ICD-10-CM

## 2023-03-09 DIAGNOSIS — E03.8 HYPOTHYROIDISM DUE TO HASHIMOTO'S THYROIDITIS: ICD-10-CM

## 2023-03-09 DIAGNOSIS — E06.3 HYPOTHYROIDISM DUE TO HASHIMOTO'S THYROIDITIS: ICD-10-CM

## 2023-03-09 RX ORDER — LEVOTHYROXINE SODIUM 0.1 MG/1
TABLET ORAL
Qty: 90 TABLET | Refills: 0 | Status: SHIPPED | OUTPATIENT
Start: 2023-03-09

## 2023-03-24 ENCOUNTER — HOSPITAL ENCOUNTER (EMERGENCY)
Age: 43
Discharge: HOME | DRG: 383 | End: 2023-03-24
Attending: EMERGENCY MEDICINE
Payer: COMMERCIAL

## 2023-03-24 VITALS
SYSTOLIC BLOOD PRESSURE: 100 MMHG | RESPIRATION RATE: 16 BRPM | HEART RATE: 59 BPM | BODY MASS INDEX: 27.44 KG/M2 | HEIGHT: 67 IN | TEMPERATURE: 97.9 F | OXYGEN SATURATION: 100 % | DIASTOLIC BLOOD PRESSURE: 63 MMHG | WEIGHT: 174.82 LBS

## 2023-03-24 DIAGNOSIS — E27.1 ADDISON'S DISEASE (HCC): ICD-10-CM

## 2023-03-24 DIAGNOSIS — K08.89 ODONTALGIA: Primary | ICD-10-CM

## 2023-03-24 LAB
EKG ATRIAL RATE: 71 BPM
EKG DIAGNOSIS: NORMAL
EKG P AXIS: 74 DEGREES
EKG P-R INTERVAL: 136 MS
EKG Q-T INTERVAL: 406 MS
EKG QRS DURATION: 98 MS
EKG QTC CALCULATION (BAZETT): 441 MS
EKG R AXIS: 58 DEGREES
EKG T AXIS: 58 DEGREES
EKG VENTRICULAR RATE: 71 BPM

## 2023-03-24 PROCEDURE — 2580000003 HC RX 258: Performed by: EMERGENCY MEDICINE

## 2023-03-24 PROCEDURE — 99284 EMERGENCY DEPT VISIT MOD MDM: CPT

## 2023-03-24 PROCEDURE — 6360000002 HC RX W HCPCS: Performed by: EMERGENCY MEDICINE

## 2023-03-24 PROCEDURE — 96374 THER/PROPH/DIAG INJ IV PUSH: CPT

## 2023-03-24 PROCEDURE — 93005 ELECTROCARDIOGRAM TRACING: CPT | Performed by: EMERGENCY MEDICINE

## 2023-03-24 PROCEDURE — 96375 TX/PRO/DX INJ NEW DRUG ADDON: CPT

## 2023-03-24 PROCEDURE — 93010 ELECTROCARDIOGRAM REPORT: CPT | Performed by: INTERNAL MEDICINE

## 2023-03-24 RX ORDER — ONDANSETRON 2 MG/ML
4 INJECTION INTRAMUSCULAR; INTRAVENOUS ONCE
Status: COMPLETED | OUTPATIENT
Start: 2023-03-24 | End: 2023-03-24

## 2023-03-24 RX ORDER — ONDANSETRON HYDROCHLORIDE 8 MG/1
8 TABLET, FILM COATED ORAL EVERY 8 HOURS PRN
Qty: 20 TABLET | Refills: 0 | Status: SHIPPED | OUTPATIENT
Start: 2023-03-24

## 2023-03-24 RX ORDER — NAPROXEN 500 MG/1
500 TABLET ORAL 2 TIMES DAILY
Qty: 20 TABLET | Refills: 0 | Status: SHIPPED | OUTPATIENT
Start: 2023-03-24 | End: 2023-04-03

## 2023-03-24 RX ORDER — PENICILLIN V POTASSIUM 500 MG/1
500 TABLET ORAL 4 TIMES DAILY
Qty: 28 TABLET | Refills: 0 | Status: SHIPPED | OUTPATIENT
Start: 2023-03-24 | End: 2023-03-31

## 2023-03-24 RX ORDER — 0.9 % SODIUM CHLORIDE 0.9 %
1000 INTRAVENOUS SOLUTION INTRAVENOUS ONCE
Status: COMPLETED | OUTPATIENT
Start: 2023-03-24 | End: 2023-03-24

## 2023-03-24 RX ADMIN — HYDROCORTISONE SODIUM SUCCINATE 100 MG: 100 INJECTION, POWDER, FOR SOLUTION INTRAMUSCULAR; INTRAVENOUS at 08:58

## 2023-03-24 RX ADMIN — ONDANSETRON 4 MG: 2 INJECTION INTRAMUSCULAR; INTRAVENOUS at 08:57

## 2023-03-24 RX ADMIN — SODIUM CHLORIDE 1000 ML: 9 INJECTION, SOLUTION INTRAVENOUS at 08:57

## 2023-03-24 ASSESSMENT — PAIN SCALES - GENERAL
PAINLEVEL_OUTOF10: 0
PAINLEVEL_OUTOF10: 0

## 2023-03-24 ASSESSMENT — PAIN - FUNCTIONAL ASSESSMENT
PAIN_FUNCTIONAL_ASSESSMENT: 0-10
PAIN_FUNCTIONAL_ASSESSMENT: 0-10

## 2023-03-24 NOTE — ED NOTES
Discharge and education instructions reviewed with patient. Teach-back successful. Pt verbalized understanding and signed d/c papers. Pt denied questions at this time. No acute distress noted. Patient instructed to follow-up as noted - return to emergency department if symptoms worsen. Patient verbalized understanding. Discharged per EDMD with discharge instructions. Pt discharged to private vehicle. Patient stable upon departure. Thanked patient for choosing [address]  for care.           Jarad Blanton, RN  03/24/23 4896

## 2023-03-24 NOTE — ED PROVIDER NOTES
tests, a time was given to answer questions. FINAL IMPRESSION      1. 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[facility])          DISPOSITION/PLAN   DISPOSITION Discharge - Pending Orders Complete 03/24/2023 09:31:48 AM  She was hemodynamically stable not tachycardic not hypotensive it sounds like she threw up from pain I gave her a stress dose of hydrocortisone because she says she had not been taking her medications over the last couple days in addition she received a liter of IV fluid and some Zofran and EKG showed no evidence of ectopic or abnormal beats plan will be for discharge follow-up with dental clinic she will be given Pen-Vee K and pain medicine including Orajel and Naprosyn    PATIENT REFERRED TO:  dental clinic    In 2 days      [address] [address] [phone]    In 2 days      DISCHARGE MEDICATIONS:  New Prescriptions    BENZOCAINE (ORAJEL) 10 % MUCOSAL GEL    Take by mouth as needed.     NAPROXEN (NAPROSYN) 500 MG TABLET    Take 1 tablet by mouth 2 times daily for 20 doses    ONDANSETRON (ZOFRAN) 8 MG TABLET    Take 1 tablet by mouth every 8 hours as needed for Nausea    PENICILLIN V POTASSIUM (VEETID) 500 MG TABLET    Take 1 tablet by mouth 4 times daily for 7 days       DISCONTINUED MEDICATIONS:  Discontinued Medications    No medications on file              (Please note that portions of this note were completed with a voice recognition program.  Efforts were made to edit the dictations but occasionally words are mis-transcribed.)    Alicia Beach MD (electronically signed)       Alicia Beach MD  03/24/23 1000

## 2023-03-24 NOTE — DISCHARGE INSTRUCTIONS
Discharge home  Ashu Elliottrosyn for pain  Do not take Naprosyn and Motrin together  Orajel  Zofran  Follow-up with your family doctor  Follow-up with dental clinic

## 2023-03-24 NOTE — ED NOTES
Iv fluids finished.  Patient states, she is feeling better and ready to go home     Ceil Bloch, RN  03/24/23 1000

## 2023-03-24 NOTE — ED NOTES
States she wears a mouth guard at night, she grinds her teeth really bad. She has been having dental pain x 2-3 days left upper front area. She also forgot to take her Florinef and Cortef for her Anil diease for a couple of days. She took her medication late last night when she remembered. She thinks, that might of made her vomit this am and have palpations.  She denies any chest pain or palpations at this time      Caren Borden, RN  03/24/23 7928

## 2023-03-25 ENCOUNTER — HOSPITAL ENCOUNTER (INPATIENT)
Age: 43
LOS: 2 days | Discharge: HOME | DRG: 383 | End: 2023-03-27
Attending: EMERGENCY MEDICINE
Payer: COMMERCIAL

## 2023-03-25 ENCOUNTER — APPOINTMENT (OUTPATIENT)
Dept: CT IMAGING | Age: 43
DRG: 383 | End: 2023-03-25
Payer: COMMERCIAL

## 2023-03-25 ENCOUNTER — APPOINTMENT (OUTPATIENT)
Dept: GENERAL RADIOLOGY | Age: 43
DRG: 383 | End: 2023-03-25
Payer: COMMERCIAL

## 2023-03-25 DIAGNOSIS — L03.211 FACIAL CELLULITIS: ICD-10-CM

## 2023-03-25 DIAGNOSIS — E86.0 DEHYDRATION: ICD-10-CM

## 2023-03-25 DIAGNOSIS — E27.1 ADDISON'S DISEASE (HCC): ICD-10-CM

## 2023-03-25 DIAGNOSIS — R11.2 NAUSEA AND VOMITING, UNSPECIFIED VOMITING TYPE: Primary | ICD-10-CM

## 2023-03-25 PROBLEM — Z86.39 HISTORY OF ADDISON'S DISEASE: Status: ACTIVE | Noted: 2023-03-25

## 2023-03-25 PROBLEM — K04.7 DENTAL ABSCESS: Status: ACTIVE | Noted: 2023-03-25

## 2023-03-25 LAB
ALBUMIN SERPL-MCNC: 3.9 G/DL (ref 3.4–5)
ALBUMIN/GLOB SERPL: 1.3 {RATIO} (ref 1.1–2.2)
ALP SERPL-CCNC: 47 U/L (ref 40–129)
ALT SERPL-CCNC: 8 U/L (ref 10–40)
AMPHETAMINES UR QL SCN>1000 NG/ML: NORMAL
ANION GAP SERPL CALCULATED.3IONS-SCNC: 13 MMOL/L (ref 3–16)
AST SERPL-CCNC: 18 U/L (ref 15–37)
BACTERIA URNS QL MICRO: ABNORMAL /HPF
BARBITURATES UR QL SCN>200 NG/ML: NORMAL
BASOPHILS # BLD: 0.1 K/UL (ref 0–0.2)
BASOPHILS NFR BLD: 0.6 %
BENZODIAZ UR QL SCN>200 NG/ML: NORMAL
BILIRUB SERPL-MCNC: 0.8 MG/DL (ref 0–1)
BILIRUB UR QL STRIP.AUTO: ABNORMAL
BUN SERPL-MCNC: 11 MG/DL (ref 7–20)
CALCIUM SERPL-MCNC: 9.1 MG/DL (ref 8.3–10.6)
CANNABINOIDS UR QL SCN>50 NG/ML: NORMAL
CHLORIDE SERPL-SCNC: 105 MMOL/L (ref 99–110)
CLARITY UR: ABNORMAL
CO2 SERPL-SCNC: 23 MMOL/L (ref 21–32)
COCAINE UR QL SCN: NORMAL
COLOR UR: YELLOW
CREAT SERPL-MCNC: 1.3 MG/DL (ref 0.6–1.1)
DEPRECATED RDW RBC AUTO: 13 % (ref 12.4–15.4)
DRUG SCREEN COMMENT UR-IMP: NORMAL
EOSINOPHIL # BLD: 0.3 K/UL (ref 0–0.6)
EOSINOPHIL NFR BLD: 2.9 %
EPI CELLS #/AREA URNS HPF: ABNORMAL /HPF (ref 0–5)
FENTANYL SCREEN, URINE: NORMAL
GFR SERPLBLD CREATININE-BSD FMLA CKD-EPI: 53 ML/MIN/{1.73_M2}
GLUCOSE SERPL-MCNC: 91 MG/DL (ref 70–99)
GLUCOSE UR STRIP.AUTO-MCNC: NEGATIVE MG/DL
HCG UR QL: NEGATIVE
HCT VFR BLD AUTO: 41.3 % (ref 36–48)
HGB BLD-MCNC: 14.2 G/DL (ref 12–16)
HGB UR QL STRIP.AUTO: ABNORMAL
HYALINE CASTS #/AREA URNS LPF: ABNORMAL /LPF (ref 0–2)
IMM GRANULOCYTES # BLD: 0 K/UL (ref 0–0.2)
IMM GRANULOCYTES NFR BLD: 0.2 %
KETONES UR STRIP.AUTO-MCNC: 80 MG/DL
LACTATE BLDV-SCNC: 1.2 MMOL/L (ref 0.4–1.9)
LEUKOCYTE ESTERASE UR QL STRIP.AUTO: NEGATIVE
LIPASE SERPL-CCNC: 17 U/L (ref 13–60)
LYMPHOCYTES # BLD: 3.3 K/UL (ref 1–5.1)
LYMPHOCYTES NFR BLD: 28.1 %
MCH RBC QN AUTO: 31.3 PG (ref 26–34)
MCHC RBC AUTO-ENTMCNC: 34.4 G/DL (ref 32–36.4)
MCV RBC AUTO: 91.2 FL (ref 80–100)
METHADONE UR QL SCN>300 NG/ML: NORMAL
MONOCYTES # BLD: 1.1 K/UL (ref 0–1.3)
MONOCYTES NFR BLD: 9.6 %
MUCOUS THREADS #/AREA URNS LPF: ABNORMAL /LPF
NEUTROPHILS # BLD: 6.9 K/UL (ref 1.7–7.7)
NEUTROPHILS NFR BLD: 58.6 %
NITRITE UR QL STRIP.AUTO: NEGATIVE
OPIATES UR QL SCN>300 NG/ML: NORMAL
OXYCODONE UR QL SCN: NORMAL
PCP UR QL SCN>25 NG/ML: NORMAL
PH UR STRIP.AUTO: 5.5 [PH] (ref 5–8)
PH UR STRIP: 5.5 [PH]
PLATELET # BLD AUTO: 309 K/UL (ref 135–450)
PMV BLD AUTO: 10.1 FL (ref 5–10.5)
POTASSIUM SERPL-SCNC: 4.2 MMOL/L (ref 3.5–5.1)
PROT SERPL-MCNC: 7 G/DL (ref 6.4–8.2)
PROT UR STRIP.AUTO-MCNC: NEGATIVE MG/DL
RBC # BLD AUTO: 4.53 M/UL (ref 4–5.2)
RBC #/AREA URNS HPF: ABNORMAL /HPF (ref 0–4)
SODIUM SERPL-SCNC: 141 MMOL/L (ref 136–145)
SP GR UR STRIP.AUTO: >=1.03 (ref 1–1.03)
UA COMPLETE W REFLEX CULTURE PNL UR: ABNORMAL
UA DIPSTICK W REFLEX MICRO PNL UR: YES
URN SPEC COLLECT METH UR: ABNORMAL
UROBILINOGEN UR STRIP-ACNC: 0.2 E.U./DL
WBC # BLD AUTO: 11.7 K/UL (ref 4–11)
WBC #/AREA URNS HPF: ABNORMAL /HPF (ref 0–5)

## 2023-03-25 PROCEDURE — 6360000002 HC RX W HCPCS: Performed by: EMERGENCY MEDICINE

## 2023-03-25 PROCEDURE — 6360000002 HC RX W HCPCS: Performed by: INTERNAL MEDICINE

## 2023-03-25 PROCEDURE — 70487 CT MAXILLOFACIAL W/DYE: CPT

## 2023-03-25 PROCEDURE — 94640 AIRWAY INHALATION TREATMENT: CPT

## 2023-03-25 PROCEDURE — C9113 INJ PANTOPRAZOLE SODIUM, VIA: HCPCS | Performed by: EMERGENCY MEDICINE

## 2023-03-25 PROCEDURE — 6360000004 HC RX CONTRAST MEDICATION: Performed by: EMERGENCY MEDICINE

## 2023-03-25 PROCEDURE — 83605 ASSAY OF LACTIC ACID: CPT

## 2023-03-25 PROCEDURE — 36415 COLL VENOUS BLD VENIPUNCTURE: CPT

## 2023-03-25 PROCEDURE — 80053 COMPREHEN METABOLIC PANEL: CPT

## 2023-03-25 PROCEDURE — 6370000000 HC RX 637 (ALT 250 FOR IP): Performed by: INTERNAL MEDICINE

## 2023-03-25 PROCEDURE — 2580000003 HC RX 258: Performed by: EMERGENCY MEDICINE

## 2023-03-25 PROCEDURE — 2580000003 HC RX 258: Performed by: INTERNAL MEDICINE

## 2023-03-25 PROCEDURE — 81001 URINALYSIS AUTO W/SCOPE: CPT

## 2023-03-25 PROCEDURE — 74176 CT ABD & PELVIS W/O CONTRAST: CPT

## 2023-03-25 PROCEDURE — 2500000003 HC RX 250 WO HCPCS: Performed by: EMERGENCY MEDICINE

## 2023-03-25 PROCEDURE — 99285 EMERGENCY DEPT VISIT HI MDM: CPT

## 2023-03-25 PROCEDURE — 96361 HYDRATE IV INFUSION ADD-ON: CPT

## 2023-03-25 PROCEDURE — 80307 DRUG TEST PRSMV CHEM ANLYZR: CPT

## 2023-03-25 PROCEDURE — 6370000000 HC RX 637 (ALT 250 FOR IP): Performed by: EMERGENCY MEDICINE

## 2023-03-25 PROCEDURE — 83690 ASSAY OF LIPASE: CPT

## 2023-03-25 PROCEDURE — 96375 TX/PRO/DX INJ NEW DRUG ADDON: CPT

## 2023-03-25 PROCEDURE — 96365 THER/PROPH/DIAG IV INF INIT: CPT

## 2023-03-25 PROCEDURE — 2500000003 HC RX 250 WO HCPCS: Performed by: NURSE PRACTITIONER

## 2023-03-25 PROCEDURE — 84703 CHORIONIC GONADOTROPIN ASSAY: CPT

## 2023-03-25 PROCEDURE — 71045 X-RAY EXAM CHEST 1 VIEW: CPT

## 2023-03-25 PROCEDURE — 85025 COMPLETE CBC W/AUTO DIFF WBC: CPT

## 2023-03-25 PROCEDURE — 1200000000 HC SEMI PRIVATE

## 2023-03-25 RX ORDER — CLINDAMYCIN PHOSPHATE 600 MG/50ML
600 INJECTION, SOLUTION INTRAVENOUS ONCE
Status: COMPLETED | OUTPATIENT
Start: 2023-03-25 | End: 2023-03-25

## 2023-03-25 RX ORDER — CLINDAMYCIN PHOSPHATE 600 MG/50ML
600 INJECTION INTRAVENOUS EVERY 8 HOURS
Status: DISCONTINUED | OUTPATIENT
Start: 2023-03-26 | End: 2023-03-26 | Stop reason: ALTCHOICE

## 2023-03-25 RX ORDER — ENOXAPARIN SODIUM 100 MG/ML
40 INJECTION SUBCUTANEOUS NIGHTLY
Status: DISCONTINUED | OUTPATIENT
Start: 2023-03-25 | End: 2023-03-27 | Stop reason: HOSPADM

## 2023-03-25 RX ORDER — KETOROLAC TROMETHAMINE 15 MG/ML
15 INJECTION, SOLUTION INTRAMUSCULAR; INTRAVENOUS EVERY 6 HOURS PRN
Status: ACTIVE | OUTPATIENT
Start: 2023-03-25 | End: 2023-03-26

## 2023-03-25 RX ORDER — SODIUM CHLORIDE 0.9 % (FLUSH) 0.9 %
5-40 SYRINGE (ML) INJECTION EVERY 12 HOURS SCHEDULED
Status: DISCONTINUED | OUTPATIENT
Start: 2023-03-25 | End: 2023-03-27 | Stop reason: HOSPADM

## 2023-03-25 RX ORDER — ONDANSETRON 2 MG/ML
4 INJECTION INTRAMUSCULAR; INTRAVENOUS ONCE
Status: COMPLETED | OUTPATIENT
Start: 2023-03-25 | End: 2023-03-25

## 2023-03-25 RX ORDER — POLYETHYLENE GLYCOL 3350 17 G/17G
17 POWDER, FOR SOLUTION ORAL DAILY PRN
Status: DISCONTINUED | OUTPATIENT
Start: 2023-03-25 | End: 2023-03-27 | Stop reason: HOSPADM

## 2023-03-25 RX ORDER — CLINDAMYCIN PHOSPHATE 600 MG/50ML
600 INJECTION INTRAVENOUS ONCE
Status: DISCONTINUED | OUTPATIENT
Start: 2023-03-25 | End: 2023-03-25

## 2023-03-25 RX ORDER — SODIUM CHLORIDE 9 MG/ML
INJECTION, SOLUTION INTRAVENOUS CONTINUOUS
Status: DISCONTINUED | OUTPATIENT
Start: 2023-03-25 | End: 2023-03-27 | Stop reason: HOSPADM

## 2023-03-25 RX ORDER — FLUDROCORTISONE ACETATE 0.1 MG/1
100 TABLET ORAL DAILY
Status: DISCONTINUED | OUTPATIENT
Start: 2023-03-26 | End: 2023-03-27 | Stop reason: HOSPADM

## 2023-03-25 RX ORDER — ACETAMINOPHEN 325 MG/1
650 TABLET ORAL EVERY 6 HOURS PRN
Status: DISCONTINUED | OUTPATIENT
Start: 2023-03-25 | End: 2023-03-27 | Stop reason: HOSPADM

## 2023-03-25 RX ORDER — LACTOBACILLUS RHAMNOSUS GG 10B CELL
2 CAPSULE ORAL
Status: DISCONTINUED | OUTPATIENT
Start: 2023-03-26 | End: 2023-03-27 | Stop reason: HOSPADM

## 2023-03-25 RX ORDER — PANTOPRAZOLE SODIUM 40 MG/10ML
40 INJECTION, POWDER, LYOPHILIZED, FOR SOLUTION INTRAVENOUS DAILY
Status: DISCONTINUED | OUTPATIENT
Start: 2023-03-25 | End: 2023-03-27 | Stop reason: HOSPADM

## 2023-03-25 RX ORDER — IPRATROPIUM BROMIDE AND ALBUTEROL SULFATE 2.5; .5 MG/3ML; MG/3ML
1 SOLUTION RESPIRATORY (INHALATION) ONCE
Status: COMPLETED | OUTPATIENT
Start: 2023-03-25 | End: 2023-03-25

## 2023-03-25 RX ORDER — METRONIDAZOLE 500 MG/100ML
500 INJECTION, SOLUTION INTRAVENOUS EVERY 8 HOURS
Status: DISCONTINUED | OUTPATIENT
Start: 2023-03-25 | End: 2023-03-26 | Stop reason: ALTCHOICE

## 2023-03-25 RX ORDER — LEVOTHYROXINE SODIUM 0.1 MG/1
100 TABLET ORAL
Status: DISCONTINUED | OUTPATIENT
Start: 2023-03-26 | End: 2023-03-27 | Stop reason: HOSPADM

## 2023-03-25 RX ORDER — SODIUM CHLORIDE 9 MG/ML
INJECTION, SOLUTION INTRAVENOUS PRN
Status: DISCONTINUED | OUTPATIENT
Start: 2023-03-25 | End: 2023-03-27 | Stop reason: HOSPADM

## 2023-03-25 RX ORDER — ONDANSETRON 2 MG/ML
4 INJECTION INTRAMUSCULAR; INTRAVENOUS EVERY 6 HOURS PRN
Status: DISCONTINUED | OUTPATIENT
Start: 2023-03-25 | End: 2023-03-27 | Stop reason: HOSPADM

## 2023-03-25 RX ORDER — ONDANSETRON 4 MG/1
4 TABLET, ORALLY DISINTEGRATING ORAL EVERY 8 HOURS PRN
Status: DISCONTINUED | OUTPATIENT
Start: 2023-03-25 | End: 2023-03-27 | Stop reason: HOSPADM

## 2023-03-25 RX ORDER — 0.9 % SODIUM CHLORIDE 0.9 %
1000 INTRAVENOUS SOLUTION INTRAVENOUS ONCE
Status: COMPLETED | OUTPATIENT
Start: 2023-03-25 | End: 2023-03-25

## 2023-03-25 RX ORDER — ACETAMINOPHEN 650 MG/1
650 SUPPOSITORY RECTAL EVERY 6 HOURS PRN
Status: DISCONTINUED | OUTPATIENT
Start: 2023-03-25 | End: 2023-03-27 | Stop reason: HOSPADM

## 2023-03-25 RX ORDER — SODIUM CHLORIDE 0.9 % (FLUSH) 0.9 %
5-40 SYRINGE (ML) INJECTION PRN
Status: DISCONTINUED | OUTPATIENT
Start: 2023-03-25 | End: 2023-03-27 | Stop reason: HOSPADM

## 2023-03-25 RX ADMIN — SODIUM CHLORIDE, PRESERVATIVE FREE 10 ML: 5 INJECTION INTRAVENOUS at 21:00

## 2023-03-25 RX ADMIN — HYDROCORTISONE SODIUM SUCCINATE 100 MG: 100 INJECTION, POWDER, FOR SOLUTION INTRAMUSCULAR; INTRAVENOUS at 13:27

## 2023-03-25 RX ADMIN — CLINDAMYCIN PHOSPHATE 600 MG: 600 INJECTION, SOLUTION INTRAVENOUS at 16:01

## 2023-03-25 RX ADMIN — ACETAMINOPHEN 650 MG: 325 TABLET ORAL at 21:37

## 2023-03-25 RX ADMIN — ENOXAPARIN SODIUM 40 MG: 100 INJECTION SUBCUTANEOUS at 21:34

## 2023-03-25 RX ADMIN — CEFAZOLIN 1000 MG: 1 INJECTION, POWDER, FOR SOLUTION INTRAMUSCULAR; INTRAVENOUS at 21:26

## 2023-03-25 RX ADMIN — ONDANSETRON 4 MG: 2 INJECTION INTRAMUSCULAR; INTRAVENOUS at 13:21

## 2023-03-25 RX ADMIN — SODIUM CHLORIDE 1000 ML: 9 INJECTION, SOLUTION INTRAVENOUS at 13:31

## 2023-03-25 RX ADMIN — IOPAMIDOL 100 ML: 755 INJECTION, SOLUTION INTRAVENOUS at 14:28

## 2023-03-25 RX ADMIN — IPRATROPIUM BROMIDE AND ALBUTEROL SULFATE 1 AMPULE: 2.5; .5 SOLUTION RESPIRATORY (INHALATION) at 13:31

## 2023-03-25 RX ADMIN — PANTOPRAZOLE SODIUM 40 MG: 40 INJECTION, POWDER, FOR SOLUTION INTRAVENOUS at 15:41

## 2023-03-25 RX ADMIN — MOMETASONE FUROATE AND FORMOTEROL FUMARATE DIHYDRATE 2 PUFF: 100; 5 AEROSOL RESPIRATORY (INHALATION) at 19:40

## 2023-03-25 RX ADMIN — METRONIDAZOLE 500 MG: 500 INJECTION, SOLUTION INTRAVENOUS at 23:31

## 2023-03-25 ASSESSMENT — PAIN DESCRIPTION - DESCRIPTORS
DESCRIPTORS: ACHING
DESCRIPTORS: ACHING
DESCRIPTORS: THROBBING

## 2023-03-25 ASSESSMENT — PAIN DESCRIPTION - ORIENTATION: ORIENTATION: LEFT

## 2023-03-25 ASSESSMENT — PAIN SCALES - GENERAL
PAINLEVEL_OUTOF10: 10
PAINLEVEL_OUTOF10: 0
PAINLEVEL_OUTOF10: 6
PAINLEVEL_OUTOF10: 3
PAINLEVEL_OUTOF10: 0

## 2023-03-25 ASSESSMENT — PAIN DESCRIPTION - PAIN TYPE
TYPE: ACUTE PAIN
TYPE: ACUTE PAIN

## 2023-03-25 ASSESSMENT — ENCOUNTER SYMPTOMS
WHEEZING: 1
VOMITING: 1
COUGH: 1
FACIAL SWELLING: 1
NAUSEA: 1
ABDOMINAL PAIN: 1

## 2023-03-25 ASSESSMENT — PAIN - FUNCTIONAL ASSESSMENT: PAIN_FUNCTIONAL_ASSESSMENT: 0-10

## 2023-03-25 ASSESSMENT — PAIN DESCRIPTION - LOCATION
LOCATION: ABDOMEN
LOCATION: FACE
LOCATION: ABDOMEN
LOCATION: ABDOMEN

## 2023-03-25 ASSESSMENT — PAIN DESCRIPTION - FREQUENCY: FREQUENCY: CONTINUOUS

## 2023-03-25 NOTE — ED NOTES
Patient told Dr. Emily Griffin her abdomen is hurting her. It feels like she has a hole in her stomach. She is rating her pain 10/10. She sounds wheezy. She told Dr. Emily Griffin the wheezing started after the last time she vomited.       Andrade Ruiz, JUANA  03/25/23 5294

## 2023-03-25 NOTE — ED NOTES
Talked with Laz Rodríguez from [facility] to inform patient has a bed.  Requesting transfer [address], RN  03/25/23 4767

## 2023-03-25 NOTE — ED NOTES
Patient has a bed assigned at [facility] room 6831.  Called access center to request transportation [address] [facility], [facility]  03/25/23 1880

## 2023-03-25 NOTE — ED NOTES
Lawyer Boyle nurse supervisor at [facility] to check on bed placement.  She states, she is in a room and will check bed board in a few minutes      Laz Sesay RN  03/25/23 0583

## 2023-03-25 NOTE — ED NOTES
Patient denies pain. She states, she is feeling better. Left side of face still remains swollen. She was updated on a transfer eta [id number].       Ben Berg RN  03/25/23 7537

## 2023-03-25 NOTE — ED NOTES
[address] is here to take patient to [facility] AT [place] room [address] [facility]  03/25/23 8390

## 2023-03-25 NOTE — ED NOTES
Report called to [address] on [address] [facility] at [address] [place] [address]  03/25/23 1998

## 2023-03-25 NOTE — ED PROVIDER NOTES
[address] [facility]    Pt Name: Stephon Pepper   MRN: [id number]   [place] 1980   Date of evaluation: 3/25/2023   Provider: Canelo Davidson MD   [facility]   Note Started: 1:12 PM EDT 3/25/23       CHIEF COMPLAINT  Emesis (Patient states, she can't stop vomiting. She is unable to keep the medication down she was given yesterday. She was seen here yesterday for dental pain and vomiting. She states, the facial swelling is worse. She also has hx of Anil disease. )       HISTORY OF PRESENT ILLNESS  Stephon Pepper is a 38 y.o. female who  has a past medical history of Hull disease ([facility] [address] Asthma, and MRSA (methicillin resistant staph aureus) culture positive. who presents to the ED complaining of dental pain nausea and vomiting. History as above. She was here yesterday with dental pain. She was given a liter of fluid and a stress dose of Solu-Cortef. Placed on penicillin. Since she has left she is got more ill. She now has facial swelling. She is unable to keep any of her regular medications down including her hydrocortisone. She has been vomiting. She is concerned about her abdominal pain that has since developed as well. She has been taking a lot of Motrin and she feels like she has a hole in her stomach. I have reviewed the following from the nursing documentation:        REVIEW OF SYSTEMS :    Review of Systems   Constitutional:  Positive for fatigue. Negative for fever. HENT:  Positive for dental problem and facial swelling. Respiratory:  Positive for cough and wheezing. Gastrointestinal:  Positive for abdominal pain, nausea and vomiting. HISTORY :      Past Medical History:   Diagnosis Date    Hull disease ([address]     Asthma     MRSA (methicillin resistant staph aureus) culture positive 09/21/2018    eye     History reviewed. No pertinent surgical history.   Family History   Problem Relation Age of Onset    Diabetes

## 2023-03-25 NOTE — H&P
intact upper extremities/lower extremities, bilaterally. Cranial nerves: II-XII intact, grossly non-focal.  Mental status: Alert, oriented, thought content appropriate. Capillary Refill: Acceptable  < 3 seconds  Peripheral Pulses: +3 Easily felt, not easily obliterated with pressure      CXR:  I have reviewed the CXR with the following interpretation: Negative for atelectasis pneumonia or effusion  EKG:  I have reviewed the EKG with the following interpretation: N/A    CBC   Recent Labs     03/25/23  1300   WBC 11.7*   HGB 14.2   HCT 41.3         RENAL  Recent Labs     03/25/23  1300      K 4.2      CO2 23   BUN 11   CREATININE 1.3*     LFT'S  Recent Labs     03/25/23  1300   AST 18   ALT 8*   BILITOT 0.8   ALKPHOS 47     COAG  No results for input(s): INR in the last 72 hours. CARDIAC ENZYMES  No results for input(s): CKTOTAL, CKMB, CKMBINDEX, TROPONINI in the last 72 hours.     U/A:    Lab Results   Component Value Date/Time    COLORU Yellow 03/25/2023 12:56 PM    WBCUA 3-5 03/25/2023 12:56 PM    RBCUA  03/25/2023 12:56 PM    MUCUS 1+ 03/25/2023 12:56 PM    BACTERIA 1+ 03/25/2023 12:56 PM    CLARITYU SLCLOUDY 03/25/2023 12:56 PM    SPECGRAV >=1.030 03/25/2023 12:56 PM    LEUKOCYTESUR Negative 03/25/2023 12:56 PM    BLOODU MODERATE 03/25/2023 12:56 PM    GLUCOSEU Negative 03/25/2023 12:56 PM    GLUCOSEU Negative 09/20/2011 06:46 AM       ABG  No results found for: Blaire Trujillo, Lesia Augustoersingel [address] Problems    Diagnosis Date Noted    Facial cellulitis [V15.901] 03/25/2023    Dental abscess [K04.7] 03/25/2023    History of Anil's disease [Z86.39] 03/25/2023         PHYSICIANS CERTIFICATION:    I certify that Chuyita Lou is expected to be hospitalized for more than 2 midnights based on the following assessment and plan:      ASSESSMENT/PLAN:    SDM: POC, admission all discussed with pt:     Dental abscess: Primary

## 2023-03-26 LAB
ANION GAP SERPL CALCULATED.3IONS-SCNC: 12 MMOL/L (ref 3–16)
BASOPHILS # BLD: 0 K/UL (ref 0–0.2)
BASOPHILS NFR BLD: 0.2 %
BUN SERPL-MCNC: 7 MG/DL (ref 7–20)
CALCIUM SERPL-MCNC: 8 MG/DL (ref 8.3–10.6)
CHLORIDE SERPL-SCNC: 106 MMOL/L (ref 99–110)
CO2 SERPL-SCNC: 18 MMOL/L (ref 21–32)
CREAT SERPL-MCNC: 0.9 MG/DL (ref 0.6–1.1)
DEPRECATED RDW RBC AUTO: 13.5 % (ref 12.4–15.4)
EOSINOPHIL # BLD: 0 K/UL (ref 0–0.6)
EOSINOPHIL NFR BLD: 0 %
GFR SERPLBLD CREATININE-BSD FMLA CKD-EPI: >60 ML/MIN/{1.73_M2}
GLUCOSE SERPL-MCNC: 123 MG/DL (ref 70–99)
HCT VFR BLD AUTO: 37.4 % (ref 36–48)
HGB BLD-MCNC: 12.3 G/DL (ref 12–16)
LACTATE BLDV-SCNC: 0.7 MMOL/L (ref 0.4–2)
LYMPHOCYTES # BLD: 0.7 K/UL (ref 1–5.1)
LYMPHOCYTES NFR BLD: 6.1 %
MCH RBC QN AUTO: 30.8 PG (ref 26–34)
MCHC RBC AUTO-ENTMCNC: 32.9 G/DL (ref 31–36)
MCV RBC AUTO: 93.5 FL (ref 80–100)
MONOCYTES # BLD: 0.6 K/UL (ref 0–1.3)
MONOCYTES NFR BLD: 5.2 %
NEUTROPHILS # BLD: 10.6 K/UL (ref 1.7–7.7)
NEUTROPHILS NFR BLD: 88.5 %
PLATELET # BLD AUTO: 249 K/UL (ref 135–450)
PMV BLD AUTO: 9.4 FL (ref 5–10.5)
POTASSIUM SERPL-SCNC: 3.6 MMOL/L (ref 3.5–5.1)
RBC # BLD AUTO: 4 M/UL (ref 4–5.2)
SODIUM SERPL-SCNC: 136 MMOL/L (ref 136–145)
WBC # BLD AUTO: 12 K/UL (ref 4–11)

## 2023-03-26 PROCEDURE — 2580000003 HC RX 258: Performed by: INTERNAL MEDICINE

## 2023-03-26 PROCEDURE — 6370000000 HC RX 637 (ALT 250 FOR IP): Performed by: INTERNAL MEDICINE

## 2023-03-26 PROCEDURE — 85025 COMPLETE CBC W/AUTO DIFF WBC: CPT

## 2023-03-26 PROCEDURE — 6360000002 HC RX W HCPCS: Performed by: EMERGENCY MEDICINE

## 2023-03-26 PROCEDURE — 2580000003 HC RX 258: Performed by: NURSE PRACTITIONER

## 2023-03-26 PROCEDURE — 6360000002 HC RX W HCPCS: Performed by: HOSPITALIST

## 2023-03-26 PROCEDURE — 6370000000 HC RX 637 (ALT 250 FOR IP): Performed by: NURSE PRACTITIONER

## 2023-03-26 PROCEDURE — 80048 BASIC METABOLIC PNL TOTAL CA: CPT

## 2023-03-26 PROCEDURE — 36415 COLL VENOUS BLD VENIPUNCTURE: CPT

## 2023-03-26 PROCEDURE — 1200000000 HC SEMI PRIVATE

## 2023-03-26 PROCEDURE — C9113 INJ PANTOPRAZOLE SODIUM, VIA: HCPCS | Performed by: EMERGENCY MEDICINE

## 2023-03-26 PROCEDURE — 94640 AIRWAY INHALATION TREATMENT: CPT

## 2023-03-26 PROCEDURE — 83605 ASSAY OF LACTIC ACID: CPT

## 2023-03-26 PROCEDURE — 94760 N-INVAS EAR/PLS OXIMETRY 1: CPT

## 2023-03-26 PROCEDURE — 2500000003 HC RX 250 WO HCPCS: Performed by: NURSE PRACTITIONER

## 2023-03-26 PROCEDURE — 2580000003 HC RX 258: Performed by: HOSPITALIST

## 2023-03-26 PROCEDURE — 6360000002 HC RX W HCPCS: Performed by: INTERNAL MEDICINE

## 2023-03-26 RX ORDER — VANCOMYCIN 1.75 G/350ML
1250 INJECTION, SOLUTION INTRAVENOUS ONCE
Status: COMPLETED | OUTPATIENT
Start: 2023-03-26 | End: 2023-03-26

## 2023-03-26 RX ADMIN — VANCOMYCIN 1250 MG: 1.75 INJECTION, SOLUTION INTRAVENOUS at 12:27

## 2023-03-26 RX ADMIN — LEVOTHYROXINE SODIUM 100 MCG: 0.1 TABLET ORAL at 06:24

## 2023-03-26 RX ADMIN — SODIUM CHLORIDE: 9 INJECTION, SOLUTION INTRAVENOUS at 20:40

## 2023-03-26 RX ADMIN — MOMETASONE FUROATE AND FORMOTEROL FUMARATE DIHYDRATE 2 PUFF: 100; 5 AEROSOL RESPIRATORY (INHALATION) at 08:55

## 2023-03-26 RX ADMIN — SODIUM CHLORIDE: 9 INJECTION, SOLUTION INTRAVENOUS at 00:35

## 2023-03-26 RX ADMIN — METRONIDAZOLE 500 MG: 500 INJECTION, SOLUTION INTRAVENOUS at 06:24

## 2023-03-26 RX ADMIN — FLUDROCORTISONE ACETATE 100 MCG: 0.1 TABLET ORAL at 08:19

## 2023-03-26 RX ADMIN — PANTOPRAZOLE SODIUM 40 MG: 40 INJECTION, POWDER, FOR SOLUTION INTRAVENOUS at 08:17

## 2023-03-26 RX ADMIN — AMPICILLIN SODIUM AND SULBACTAM SODIUM 3000 MG: 2; 1 INJECTION, POWDER, FOR SOLUTION INTRAMUSCULAR; INTRAVENOUS at 11:27

## 2023-03-26 RX ADMIN — SODIUM CHLORIDE, PRESERVATIVE FREE 5 ML: 5 INJECTION INTRAVENOUS at 09:00

## 2023-03-26 RX ADMIN — MOMETASONE FUROATE AND FORMOTEROL FUMARATE DIHYDRATE 2 PUFF: 100; 5 AEROSOL RESPIRATORY (INHALATION) at 19:58

## 2023-03-26 RX ADMIN — VANCOMYCIN HYDROCHLORIDE 1000 MG: 1 INJECTION, POWDER, LYOPHILIZED, FOR SOLUTION INTRAVENOUS at 20:41

## 2023-03-26 RX ADMIN — HYDROCORTISONE SODIUM SUCCINATE 50 MG: 100 INJECTION, POWDER, FOR SOLUTION INTRAMUSCULAR; INTRAVENOUS at 14:10

## 2023-03-26 RX ADMIN — CLINDAMYCIN PHOSPHATE 600 MG: 600 INJECTION, SOLUTION INTRAVENOUS at 04:21

## 2023-03-26 RX ADMIN — HYDROCORTISONE SODIUM SUCCINATE 50 MG: 100 INJECTION, POWDER, FOR SOLUTION INTRAMUSCULAR; INTRAVENOUS at 02:20

## 2023-03-26 RX ADMIN — Medication 2 CAPSULE: at 08:19

## 2023-03-26 RX ADMIN — AMPICILLIN SODIUM AND SULBACTAM SODIUM 3000 MG: 2; 1 INJECTION, POWDER, FOR SOLUTION INTRAMUSCULAR; INTRAVENOUS at 17:41

## 2023-03-26 RX ADMIN — ENOXAPARIN SODIUM 40 MG: 100 INJECTION SUBCUTANEOUS at 20:41

## 2023-03-26 NOTE — CONSULTS
Clinical Pharmacy Note  Vancomycin Consult    Kati Rodriguez is a 38 y.o. female ordered Vancomycin for facial cellulitis / abscess; consult received from Dr. Jose Duque to manage therapy. Also receiving Unasyn. Allergies:  Patient has no known allergies. Temp max:  Temp (24hrs), Av.4 °F (36.9 °C), Min:98 °F (36.7 °C), Max:99.3 °F (37.4 °C)      Recent Labs     23  1300 23  0705   WBC 11.7* 12.0*       Recent Labs     23  1300 23  0705   BUN 11 7   CREATININE 1.3* 0.9         Intake/Output Summary (Last 24 hours) at 3/26/2023 1136  Last data filed at 3/26/2023 0900  Gross per 24 hour   Intake 10 ml   Output --   Net 10 ml       Culture Results:  pending    Ht Readings from Last 1 Encounters:   23 5' 7\" (1.702 m)        Wt Readings from Last 1 Encounters:   23 167 lb 15.9 oz (76.2 kg)         Estimated Creatinine Clearance: 88 mL/min (based on SCr of 0.9 mg/dL). Assessment/Plan:  Day # 1 of vancomycin. Patient had a mild MELISSA on admission; however, serum creatinine has improved today (1.3 > 0.9 mg/dL)  Will order Vancomycin 1,250 mg IVPB x 1 followed by 1,000 mg IVPB Q12H thereafter. Goal -600  Predicted AUC with current regimen is 500    Thank you for the consult. Will continue to follow.     Gin Brown RPH, PharmD, BCPS  3/26/2023 11:38 AM

## 2023-03-27 VITALS
OXYGEN SATURATION: 96 % | HEIGHT: 67 IN | SYSTOLIC BLOOD PRESSURE: 95 MMHG | BODY MASS INDEX: 26.68 KG/M2 | RESPIRATION RATE: 16 BRPM | HEART RATE: 82 BPM | DIASTOLIC BLOOD PRESSURE: 64 MMHG | WEIGHT: 169.97 LBS | TEMPERATURE: 97.9 F

## 2023-03-27 DIAGNOSIS — E27.1 ADDISON DISEASE (HCC): ICD-10-CM

## 2023-03-27 LAB
ANION GAP SERPL CALCULATED.3IONS-SCNC: 9 MMOL/L (ref 3–16)
BUN SERPL-MCNC: 5 MG/DL (ref 7–20)
CALCIUM SERPL-MCNC: 7.7 MG/DL (ref 8.3–10.6)
CHLORIDE SERPL-SCNC: 109 MMOL/L (ref 99–110)
CO2 SERPL-SCNC: 21 MMOL/L (ref 21–32)
CREAT SERPL-MCNC: 0.7 MG/DL (ref 0.6–1.1)
DEPRECATED RDW RBC AUTO: 13.9 % (ref 12.4–15.4)
GFR SERPLBLD CREATININE-BSD FMLA CKD-EPI: >60 ML/MIN/{1.73_M2}
GLUCOSE SERPL-MCNC: 119 MG/DL (ref 70–99)
HCT VFR BLD AUTO: 32.3 % (ref 36–48)
HGB BLD-MCNC: 10.9 G/DL (ref 12–16)
MCH RBC QN AUTO: 31.6 PG (ref 26–34)
MCHC RBC AUTO-ENTMCNC: 33.7 G/DL (ref 31–36)
MCV RBC AUTO: 93.9 FL (ref 80–100)
PLATELET # BLD AUTO: 236 K/UL (ref 135–450)
PMV BLD AUTO: 9.7 FL (ref 5–10.5)
POTASSIUM SERPL-SCNC: 3.4 MMOL/L (ref 3.5–5.1)
RBC # BLD AUTO: 3.44 M/UL (ref 4–5.2)
SODIUM SERPL-SCNC: 139 MMOL/L (ref 136–145)
WBC # BLD AUTO: 7.4 K/UL (ref 4–11)

## 2023-03-27 PROCEDURE — 6360000002 HC RX W HCPCS: Performed by: HOSPITALIST

## 2023-03-27 PROCEDURE — C9113 INJ PANTOPRAZOLE SODIUM, VIA: HCPCS | Performed by: EMERGENCY MEDICINE

## 2023-03-27 PROCEDURE — 80048 BASIC METABOLIC PNL TOTAL CA: CPT

## 2023-03-27 PROCEDURE — 2580000003 HC RX 258: Performed by: HOSPITALIST

## 2023-03-27 PROCEDURE — 6370000000 HC RX 637 (ALT 250 FOR IP): Performed by: NURSE PRACTITIONER

## 2023-03-27 PROCEDURE — 6360000002 HC RX W HCPCS: Performed by: EMERGENCY MEDICINE

## 2023-03-27 PROCEDURE — 6370000000 HC RX 637 (ALT 250 FOR IP): Performed by: INTERNAL MEDICINE

## 2023-03-27 PROCEDURE — 36415 COLL VENOUS BLD VENIPUNCTURE: CPT

## 2023-03-27 PROCEDURE — 85027 COMPLETE CBC AUTOMATED: CPT

## 2023-03-27 PROCEDURE — 94640 AIRWAY INHALATION TREATMENT: CPT

## 2023-03-27 PROCEDURE — 94760 N-INVAS EAR/PLS OXIMETRY 1: CPT

## 2023-03-27 PROCEDURE — 6360000002 HC RX W HCPCS: Performed by: INTERNAL MEDICINE

## 2023-03-27 RX ORDER — HYDROCORTISONE 5 MG/1
TABLET ORAL
Qty: 120 TABLET | Refills: 0 | Status: SHIPPED | OUTPATIENT
Start: 2023-03-27 | End: 2023-03-27 | Stop reason: HOSPADM

## 2023-03-27 RX ORDER — CLINDAMYCIN HYDROCHLORIDE 300 MG/1
300 CAPSULE ORAL 3 TIMES DAILY
Qty: 21 CAPSULE | Refills: 0 | Status: SHIPPED | OUTPATIENT
Start: 2023-03-27 | End: 2023-04-03

## 2023-03-27 RX ADMIN — AMPICILLIN SODIUM AND SULBACTAM SODIUM 3000 MG: 2; 1 INJECTION, POWDER, FOR SOLUTION INTRAMUSCULAR; INTRAVENOUS at 05:13

## 2023-03-27 RX ADMIN — MOMETASONE FUROATE AND FORMOTEROL FUMARATE DIHYDRATE 2 PUFF: 100; 5 AEROSOL RESPIRATORY (INHALATION) at 09:34

## 2023-03-27 RX ADMIN — AMPICILLIN SODIUM AND SULBACTAM SODIUM 3000 MG: 2; 1 INJECTION, POWDER, FOR SOLUTION INTRAMUSCULAR; INTRAVENOUS at 00:33

## 2023-03-27 RX ADMIN — Medication 2 CAPSULE: at 09:03

## 2023-03-27 RX ADMIN — PANTOPRAZOLE SODIUM 40 MG: 40 INJECTION, POWDER, FOR SOLUTION INTRAVENOUS at 09:03

## 2023-03-27 RX ADMIN — FLUDROCORTISONE ACETATE 100 MCG: 0.1 TABLET ORAL at 09:03

## 2023-03-27 RX ADMIN — LEVOTHYROXINE SODIUM 100 MCG: 0.1 TABLET ORAL at 05:12

## 2023-03-27 RX ADMIN — HYDROCORTISONE SODIUM SUCCINATE 50 MG: 100 INJECTION, POWDER, FOR SOLUTION INTRAMUSCULAR; INTRAVENOUS at 00:34

## 2023-03-27 RX ADMIN — VANCOMYCIN HYDROCHLORIDE 1000 MG: 1 INJECTION, POWDER, LYOPHILIZED, FOR SOLUTION INTRAVENOUS at 09:08

## 2023-03-27 NOTE — FLOWSHEET NOTE
Slept fair, stated that edema has improved to face. Denies pain or other needs at this time, will monitor.

## 2023-03-27 NOTE — PLAN OF CARE
Problem: Discharge Planning  Goal: Discharge to home or other facility with appropriate resources  3/26/2023 1307 by Arianne Torres RN  Outcome: Progressing  Flowsheets (Taken 3/26/2023 0815)  Discharge to home or other facility with appropriate resources:   Identify barriers to discharge with patient and caregiver   Arrange for needed discharge resources and transportation as appropriate   Identify discharge learning needs (meds, wound care, etc)     Problem: Pain  Goal: Verbalizes/displays adequate comfort level or baseline comfort level  3/26/2023 1307 by Arianne Torres RN  Outcome: Progressing     Problem: Safety - Adult  Goal: Free from fall injury  Outcome: Progressing     Problem: Infection - Adult  Goal: Absence of infection at discharge  Outcome: Progressing  Goal: Absence of infection during hospitalization  Outcome: Progressing     Problem: Metabolic/Fluid and Electrolytes - Adult  Goal: Electrolytes maintained within normal limits  Outcome: Progressing
Problem: Discharge Planning  Goal: Discharge to home or other facility with appropriate resources  3/27/2023 1119 by Franc Ponce RN  Outcome: Completed  3/27/2023 0008 by Sydni Ramos RN  Outcome: Progressing  Flowsheets (Taken 3/26/2023 2040)  Discharge to home or other facility with appropriate resources:   Identify barriers to discharge with patient and caregiver   Arrange for needed discharge resources and transportation as appropriate     Problem: Pain  Goal: Verbalizes/displays adequate comfort level or baseline comfort level  3/27/2023 1119 by Franc Ponce RN  Outcome: Completed  3/27/2023 0008 by Sydni Ramos RN  Outcome: Progressing     Problem: Safety - Adult  Goal: Free from fall injury  3/27/2023 1119 by Franc Ponce RN  Outcome: Completed  3/27/2023 0008 by Sydni Ramos RN  Outcome: Progressing     Problem: Infection - Adult  Goal: Absence of infection at discharge  3/27/2023 1119 by Franc Ponce RN  Outcome: Completed  3/27/2023 0008 by Sydni Ramos RN  Outcome: Progressing  [address] (Taken 3/26/2023 2040)  Absence of infection at discharge:   Assess and monitor for signs and symptoms of infection   Monitor lab/diagnostic results   Monitor all insertion sites i.e., indwelling lines, tubes and drains   Administer medications as ordered   Instruct and encourage patient and family to use good hand hygiene technique   Identify and instruct in appropriate isolation precautions for identified infection/condition  Goal: Absence of infection during hospitalization  3/27/2023 1119 by Franc Ponce RN  Outcome: Completed  3/27/2023 0008 by Sydni Ramos RN  Outcome: Progressing  Flowsheets (Taken 3/26/2023 2040)  Absence of infection during hospitalization:   Assess and monitor for signs and symptoms of infection   Monitor lab/diagnostic results   Monitor all insertion sites i.e., indwelling lines, tubes and drains   Administer medications as ordered   Instruct and encourage patient and
Problem: Discharge Planning  Goal: Discharge to home or other facility with appropriate resources  3/27/2023 1119 by Yolande Ferro RN  Outcome: Completed  3/27/2023 0008 by Darian Patel RN  Outcome: Progressing  Flowsheets (Taken 3/26/2023 2040)  Discharge to home or other facility with appropriate resources:   Identify barriers to discharge with patient and caregiver   Arrange for needed discharge resources and transportation as appropriate     Problem: Pain  Goal: Verbalizes/displays adequate comfort level or baseline comfort level  3/27/2023 1119 by Yolande Ferro RN  Outcome: Completed  3/27/2023 0008 by Darian Patel RN  Outcome: Progressing     Problem: Safety - Adult  Goal: Free from fall injury  3/27/2023 1119 by Yolande Ferro RN  Outcome: Completed  3/27/2023 0008 by Darian Patel RN  Outcome: Progressing     Problem: Infection - Adult  Goal: Absence of infection at discharge  3/27/2023 1119 by Yolande Ferro RN  Outcome: Completed  3/27/2023 0008 by Darian Patel RN  Outcome: Progressing  [address] (Taken 3/26/2023 2040)  Absence of infection at discharge:   Assess and monitor for signs and symptoms of infection   Monitor lab/diagnostic results   Monitor all insertion sites i.e., indwelling lines, tubes and drains   Administer medications as ordered   Instruct and encourage patient and family to use good hand hygiene technique   Identify and instruct in appropriate isolation precautions for identified infection/condition  Goal: Absence of infection during hospitalization  3/27/2023 1119 by Yolande Ferro RN  Outcome: Completed  3/27/2023 0008 by Darian Patel RN  Outcome: Progressing  Flowsheets (Taken 3/26/2023 2040)  Absence of infection during hospitalization:   Assess and monitor for signs and symptoms of infection   Monitor lab/diagnostic results   Monitor all insertion sites i.e., indwelling lines, tubes and drains   Administer medications as ordered   Instruct and encourage patient and
infection during hospitalization:   Assess and monitor for signs and symptoms of infection   Monitor lab/diagnostic results   Monitor all insertion sites i.e., indwelling lines, tubes and drains   Administer medications as ordered   Instruct and encourage patient and family to use good hand hygiene technique   Identify and instruct in appropriate isolation precautions for identified infection/condition  3/26/2023 1307 by Brianna Strong RN  Outcome: Progressing     Problem: Metabolic/Fluid and Electrolytes - Adult  Goal: Electrolytes maintained within normal limits  3/27/2023 0008 by Tan Smith RN  Outcome: Progressing  Flowsheets (Taken 3/26/2023 2040)  Electrolytes maintained within normal limits:   Monitor labs and assess patient for signs and symptoms of electrolyte imbalances   Administer electrolyte replacement as ordered   Monitor response to electrolyte replacements, including repeat lab results as appropriate  3/26/2023 1307 by Brianna Strong RN  Outcome: Progressing     Problem: Skin/Tissue Integrity - Adult  Goal: Skin integrity remains intact  Outcome: Progressing  Flowsheets (Taken 3/26/2023 2040)  Skin Integrity Remains Intact:   Monitor for areas of redness and/or skin breakdown   Assess vascular access sites hourly

## 2023-03-27 NOTE — PROGRESS NOTES
4 Eyes Skin Assessment     NAME:  Landon Mathew  YOB: 1980  MEDICAL RECORD NUMBER:  [id number]    The patient is being assessed for  Admission    I agree that One RN has performed a thorough Head to Toe Skin Assessment on the patient. ALL assessment sites listed below have been assessed. Areas assessed by both nurses:    Head, Face, Ears, Shoulders, Back, Chest, Arms, Elbows, Hands, Sacrum. Buttock, Coccyx, Ischium, and Legs. Feet and Heels        Does the Patient have a Wound?  No noted wound(s)       Lv Prevention initiated by RN: No   Wound Care Orders initiated by RN: No    Pressure Injury (Stage 3,4, Unstageable, DTI, NWPT, and Complex wounds) if present, place referral order by RN under : No    New and Established Ostomies, if present place, referral order under : No      Nurse 1 eSignature: Electronically signed by Stacey Lam RN on 3/26/23 at 4:24 AM EDT      Nurse 2 eSignature: Electronically signed by Barbie Goldman RN on 3/26/23 at 4:30 AM EDT
Discharge instructions and medications reviewed with patient. Pt. Verbalized understanding. Denies questions. Discontinued IV without complications. Pt. tolerated well. Pt ambulated off unit to home with all belongings.   Electronically signed by Birdie Renee RN on 3/27/2023 at 4:10 PM
Medication Reconciliation    List of medications patient is currently taking is complete. Source of information: 1. Conversation with patient/RN                                      2. EPIC records      Allergies  Patient has no known allergies.      Fozia Gomes RPH, PharmD, BCPS  3/26/2023 11:01 AM
transcribed using [id number] [employer]. Please disregard any translational errors.

## 2023-03-29 NOTE — DISCHARGE SUMMARY
Hospital Medicine Discharge Summary      Patient ID: Jennifer Price [id number]     [facility]    Admit Date: 3/25/2023     Discharge Date: 3/27/2023      Admitting Physician: No admitting provider for patient encounter. Discharge Physician: Simona Vázquez MD     Discharge Diagnoses: Active Hospital Problems    Diagnosis Date Noted    Facial cellulitis [W16.143] 03/25/2023    Dental abscess [K04.7] 03/25/2023    History of Graves's disease [Z86.39] 03/25/2023         The patient was seen and examined on the day of discharge and this discharge summary is in conjunction with any daily progress note from day of discharge. HOSPITAL COURSE  Patient demographics:  The patient  Jennifer Prcie is a 38 y.o. female      Significant past medical history:       Patient Active Problem List   Diagnosis    Hypothyroidism due to Hashimoto's thyroiditis    Current mild episode of major depressive disorder without prior episode ([facility] [address]    Active asthma    Facial cellulitis    Dental abscess    History of Graves's disease            Presenting symptoms:  Left facial swelling, n/v     Diagnostic workup:   CT FACIAL BONES W CONTRAST  CT ABDOMEN PELVIS WO CONTRAST       CONSULTS DURING ADMISSION :   IP CONSULT TO HOSPITALIST  PHARMACY TO DOSE VANCOMYCIN  IP CONSULT TO INFECTIOUS DISEASES        Patient was diagnosed with:  Dental abscess  Facial cellulitis:  Anil's disease        Treatment while inpatient:   38 y.o. female with medical history significant for Graves's disease, MRSA, asthma     And hypothyroidism                       Patient presented to the emergency department with pain on the left side of the face  With left-sided swelling starting from angle of the mouth and involving left lower orbital margin. Patient was diagnosed with tooth abscess and facial cellulitis and started on treatment with broad-spectrum antibiotics.   Patient responded to the

## 2023-05-16 ENCOUNTER — TELEPHONE (OUTPATIENT)
Dept: ENDOCRINOLOGY | Age: 43
End: 2023-05-16

## 2023-05-16 DIAGNOSIS — E27.1 ADDISON DISEASE (HCC): ICD-10-CM

## 2023-05-16 RX ORDER — HYDROCORTISONE 5 MG/1
TABLET ORAL
Qty: 120 TABLET | Refills: 0 | Status: SHIPPED | OUTPATIENT
Start: 2023-05-16

## 2023-05-16 NOTE — TELEPHONE ENCOUNTER
Fax from [facility]    Pt request refill for Hydrocortisone 5mg tab    LOV    5-24-21  FOV   5-25-23

## 2023-05-25 ENCOUNTER — TELEPHONE (OUTPATIENT)
Dept: ENDOCRINOLOGY | Age: 43
End: 2023-05-25

## 2023-05-25 DIAGNOSIS — E06.3 HYPOTHYROIDISM DUE TO HASHIMOTO'S THYROIDITIS: ICD-10-CM

## 2023-05-25 DIAGNOSIS — E03.8 HYPOTHYROIDISM DUE TO HASHIMOTO'S THYROIDITIS: ICD-10-CM

## 2023-05-25 DIAGNOSIS — J45.909 ACTIVE ASTHMA: ICD-10-CM

## 2023-05-25 DIAGNOSIS — Z86.39 HISTORY OF ADDISON'S DISEASE: ICD-10-CM

## 2023-05-26 LAB
ALBUMIN SERPL-MCNC: 4.2 G/DL (ref 3.4–5)
ALBUMIN/GLOB SERPL: 1.8 {RATIO} (ref 1.1–2.2)
ALP SERPL-CCNC: 46 U/L (ref 40–129)
ALT SERPL-CCNC: 12 U/L (ref 10–40)
ANION GAP SERPL CALCULATED.3IONS-SCNC: 13 MMOL/L (ref 3–16)
AST SERPL-CCNC: 20 U/L (ref 15–37)
BILIRUB SERPL-MCNC: 0.7 MG/DL (ref 0–1)
BUN SERPL-MCNC: 14 MG/DL (ref 7–20)
CALCIUM SERPL-MCNC: 9.3 MG/DL (ref 8.3–10.6)
CHLORIDE SERPL-SCNC: 105 MMOL/L (ref 99–110)
CO2 SERPL-SCNC: 21 MMOL/L (ref 21–32)
CREAT SERPL-MCNC: 1 MG/DL (ref 0.6–1.1)
GFR SERPLBLD CREATININE-BSD FMLA CKD-EPI: >60 ML/MIN/{1.73_M2}
GLUCOSE SERPL-MCNC: 82 MG/DL (ref 70–99)
POTASSIUM SERPL-SCNC: 4.3 MMOL/L (ref 3.5–5.1)
PROT SERPL-MCNC: 6.6 G/DL (ref 6.4–8.2)
SODIUM SERPL-SCNC: 139 MMOL/L (ref 136–145)
T4 FREE SERPL-MCNC: 1.4 NG/DL (ref 0.9–1.8)
THYROPEROXIDASE AB SERPL IA-ACNC: 513 IU/ML
TSH SERPL DL<=0.005 MIU/L-ACNC: 9.52 UIU/ML (ref 0.27–4.2)

## 2023-05-26 NOTE — TELEPHONE ENCOUNTER
Fax from [facility] w/ approval Hydrocortisone SOD Succinate from 5/25/23-5/23/24

## 2023-05-27 LAB — ALDOST SERPL-MCNC: <3 NG/DL

## 2023-05-30 LAB
ACTH PLAS-MCNC: 333 PG/ML (ref 6–58)
RENIN PLAS-CCNC: 6.3 NG/ML/HR
THYROGLOB SERPL-MCNC: 2 NG/ML (ref 1.3–31.8)

## 2023-05-31 LAB — MISCELLANEOUS LAB TEST ORDER: ABNORMAL

## 2023-06-11 DIAGNOSIS — E27.1 ADDISON DISEASE (HCC): ICD-10-CM

## 2023-06-12 RX ORDER — HYDROCORTISONE 5 MG/1
TABLET ORAL
Qty: 120 TABLET | Refills: 3 | Status: SHIPPED | OUTPATIENT
Start: 2023-06-12

## 2023-07-12 RX ORDER — LEVOTHYROXINE SODIUM 112 UG/1
TABLET ORAL
Qty: 30 TABLET | Refills: 3 | OUTPATIENT
Start: 2023-07-12

## 2023-09-29 DIAGNOSIS — E27.1 ADDISON DISEASE (HCC): ICD-10-CM

## 2023-10-02 RX ORDER — FLUDROCORTISONE ACETATE 0.1 MG/1
TABLET ORAL
Qty: 90 TABLET | Refills: 1 | Status: SHIPPED | OUTPATIENT
Start: 2023-10-02

## 2023-12-02 DIAGNOSIS — E03.8 HYPOTHYROIDISM DUE TO HASHIMOTO'S THYROIDITIS: ICD-10-CM

## 2023-12-02 DIAGNOSIS — E06.3 HYPOTHYROIDISM DUE TO HASHIMOTO'S THYROIDITIS: ICD-10-CM

## 2023-12-04 RX ORDER — LEVOTHYROXINE SODIUM 0.1 MG/1
TABLET ORAL
Qty: 90 TABLET | Refills: 0 | OUTPATIENT
Start: 2023-12-04

## 2023-12-04 NOTE — TELEPHONE ENCOUNTER
Requested Prescriptions     Refused Prescriptions Disp Refills    levothyroxine (SYNTHROID) 100 MCG tablet [Pharmacy Med Name: LEVOTHYROXINE 100 MCG TABLET] 90 tablet 0     Sig: TAKE 1 TABLET BY MOUTH EVERY DAY     Refused due to medication dosage adjustment

## 2024-02-19 DIAGNOSIS — E03.8 HYPOTHYROIDISM DUE TO HASHIMOTO'S THYROIDITIS: ICD-10-CM

## 2024-02-19 DIAGNOSIS — E06.3 HYPOTHYROIDISM DUE TO HASHIMOTO'S THYROIDITIS: ICD-10-CM

## 2024-02-20 RX ORDER — LEVOTHYROXINE SODIUM 0.1 MG/1
TABLET ORAL
Qty: 90 TABLET | Refills: 0 | OUTPATIENT
Start: 2024-02-20

## 2024-02-20 RX ORDER — LEVOTHYROXINE SODIUM 112 UG/1
112 TABLET ORAL
Qty: 90 TABLET | Refills: 1 | Status: SHIPPED | OUTPATIENT
Start: 2024-02-20

## 2024-03-17 DIAGNOSIS — E27.1 ADDISON DISEASE (HCC): ICD-10-CM

## 2024-03-18 RX ORDER — HYDROCORTISONE 5 MG/1
TABLET ORAL
Qty: 120 TABLET | Refills: 3 | Status: SHIPPED | OUTPATIENT
Start: 2024-03-18

## 2024-03-18 NOTE — TELEPHONE ENCOUNTER
Requested Prescriptions     Pending Prescriptions Disp Refills    hydrocortisone (CORTEF) 5 MG tablet [Pharmacy Med Name: HYDROCORTISONE 5 MG TABLET] 120 tablet 3     Sig: TAKE 3 TABS IN THE MORNING AND 1 TABLET IN THE EVENING       [facility]/pharmacy #[id number] - [place], [place] - [address] - [phone] - [phone]  [address]  [place] [address]  Phone: [phone] Fax: [phone]    She has been taking 15 mg of HC in AM and 5 mg in Pm

## 2024-10-10 DIAGNOSIS — E27.1 ADDISON DISEASE (HCC): ICD-10-CM

## 2024-10-10 RX ORDER — FLUDROCORTISONE ACETATE 0.1 MG/1
TABLET ORAL
Qty: 30 TABLET | Refills: 5 | Status: SHIPPED | OUTPATIENT
Start: 2024-10-10

## 2024-11-14 ENCOUNTER — TELEPHONE (OUTPATIENT)
Dept: ENDOCRINOLOGY | Age: 44
End: 2024-11-14

## 2024-11-14 DIAGNOSIS — E06.3 HYPOTHYROIDISM DUE TO HASHIMOTO'S THYROIDITIS: Primary | ICD-10-CM

## 2024-11-14 DIAGNOSIS — Z86.39 HISTORY OF ADDISON'S DISEASE: ICD-10-CM

## 2024-11-14 NOTE — TELEPHONE ENCOUNTER
Pt has appt next week  Will need lab orders placed    Do not have to call pt , she was on for today and canceled

## 2024-11-19 ENCOUNTER — OFFICE VISIT (OUTPATIENT)
Dept: ENDOCRINOLOGY | Age: 44
End: 2024-11-19
Payer: COMMERCIAL

## 2024-11-19 VITALS
RESPIRATION RATE: 14 BRPM | TEMPERATURE: 98 F | SYSTOLIC BLOOD PRESSURE: 92 MMHG | HEART RATE: 65 BPM | WEIGHT: 149 LBS | DIASTOLIC BLOOD PRESSURE: 57 MMHG | HEIGHT: 67 IN | BODY MASS INDEX: 23.39 KG/M2

## 2024-11-19 DIAGNOSIS — E27.1 ADDISON DISEASE (HCC): Primary | ICD-10-CM

## 2024-11-19 DIAGNOSIS — E06.3 HYPOTHYROIDISM DUE TO HASHIMOTO'S THYROIDITIS: ICD-10-CM

## 2024-11-19 PROCEDURE — G8484 FLU IMMUNIZE NO ADMIN: HCPCS | Performed by: INTERNAL MEDICINE

## 2024-11-19 PROCEDURE — G8420 CALC BMI NORM PARAMETERS: HCPCS | Performed by: INTERNAL MEDICINE

## 2024-11-19 PROCEDURE — G8427 DOCREV CUR MEDS BY ELIG CLIN: HCPCS | Performed by: INTERNAL MEDICINE

## 2024-11-19 PROCEDURE — 1036F TOBACCO NON-USER: CPT | Performed by: INTERNAL MEDICINE

## 2024-11-19 PROCEDURE — 99214 OFFICE O/P EST MOD 30 MIN: CPT | Performed by: INTERNAL MEDICINE

## 2024-11-19 RX ORDER — HYDROCORTISONE 5 MG/1
TABLET ORAL
Qty: 120 TABLET | Refills: 3 | Status: SHIPPED | OUTPATIENT
Start: 2024-11-19

## 2024-11-19 RX ORDER — FLUDROCORTISONE ACETATE 0.1 MG/1
TABLET ORAL
Qty: 30 TABLET | Refills: 5 | Status: SHIPPED | OUTPATIENT
Start: 2024-11-19

## 2024-11-19 RX ORDER — LEVOTHYROXINE SODIUM 112 UG/1
112 TABLET ORAL
Qty: 90 TABLET | Refills: 1 | Status: SHIPPED | OUTPATIENT
Start: 2024-11-19

## 2024-11-19 RX ORDER — HYDROCORTISONE SODIUM SUCCINATE 100 MG/2ML
INJECTION INTRAMUSCULAR; INTRAVENOUS
Qty: 1 EACH | Refills: 1 | Status: SHIPPED | OUTPATIENT
Start: 2024-11-19

## 2024-11-19 NOTE — PROGRESS NOTES
SUBJECTIVE:  Adelia Almendarez is a 44 y.o. female who is seen for Addisons Disease and Hypothyroidism. Previously she has seen Dr Cline and YONG goode at [facility].  Patient was diagnosed with Addisons disease at the age of 23. At the time of diagnoses she presented with extreme fatigue, weight loss, and skin darkening. The symptoms started with her first pregnancy although she was not started on any steroids. After her delivery she was diagnosed with Anil's and started on Hydrocortisone. She noted improvement in her fatigue and darkening of the skin and gums.she has been taking HC 15 mg in am and 5 mg in PM .  Previously became cushingoid on higher doses of steroids . 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 She is on 100 micrograms of synthroid for the last 2-3 months as she was told by her PCP that her TSH was elevated.  There is no family history of adrenal issues, but her mom and brother have type 2 DM and daughter has type 1 DM.    Pt also has Asthma for which she is on inhalers and this has been stable.  Pt also has depression and is on medication     INTERIM   She has lost 20 lbs by working on diet and exercise   Denies any changes in skin color   She denies any Nausea , vomiting or abdominal pain     Past Medical History:   Diagnosis Date    Anil disease (HCC)     Asthma     MRSA (methicillin resistant staph aureus) culture positive 09/21/2018    eye     Patient Active Problem List    Diagnosis Date Noted    Facial cellulitis 03/25/2023    Dental abscess 03/25/2023    History of Avella's disease 03/25/2023    Hypothyroidism due to Hashimoto's thyroiditis 04/08/2020    Current mild episode of major depressive disorder without prior episode (HCC) 04/08/2020    Active asthma 04/08/2020     History reviewed. No pertinent surgical history.  Family History   Problem Relation Age of Onset    Diabetes type 2  Mother     Cancer Brother

## 2025-07-10 DIAGNOSIS — E06.3 HYPOTHYROIDISM DUE TO HASHIMOTO'S THYROIDITIS: Primary | ICD-10-CM

## 2025-07-10 DIAGNOSIS — E27.1 ADDISON DISEASE (HCC): ICD-10-CM

## 2025-07-10 RX ORDER — HYDROCORTISONE 5 MG/1
TABLET ORAL
Qty: 120 TABLET | Refills: 3 | Status: SHIPPED | OUTPATIENT
Start: 2025-07-10

## 2025-07-10 RX ORDER — LEVOTHYROXINE SODIUM 112 UG/1
112 TABLET ORAL
Qty: 90 TABLET | Refills: 1 | Status: SHIPPED | OUTPATIENT
Start: 2025-07-10

## 2025-07-10 NOTE — TELEPHONE ENCOUNTER
Medication:   Requested Prescriptions     Pending Prescriptions Disp Refills    hydrocortisone (CORTEF) 5 MG tablet [Pharmacy Med Name: HYDROCORTISONE 5 MG TABLET] 120 tablet 3     Sig: TAKE 3 TABS IN THE MORNING AND 1 TABLET IN THE EVENING    levothyroxine (SYNTHROID) 112 MCG tablet [Pharmacy Med Name: LEVOTHYROXINE 112 MCG TABLET] 90 tablet 1     Sig: TAKE 1 TABLET BY MOUTH EVERY DAY BEFORE BREAKFAST         Last appt: 11/19/2024   Next appt: 8/19/2025      Lab Results   Component Value Date/Time    TSH 9.52 05/25/2023 11:34 AM    T4FREE 1.4 05/25/2023 11:34 AM     Current Outpatient Medications   Medication Sig Dispense Refill    hydrocortisone (CORTEF) 5 MG tablet TAKE 3 TABS IN THE MORNING AND 1 TABLET IN THE EVENING 120 tablet 3    levothyroxine (SYNTHROID) 112 MCG tablet TAKE 1 TABLET BY MOUTH EVERY DAY BEFORE BREAKFAST 90 tablet 1    fludrocortisone (FLORINEF) 0.1 MG tablet TAKE 1 TABLET BY MOUTH EVERY DAY 30 tablet 5    hydrocortisone sodium succinate PF (SOLU-CORTEF) 100 MG SOLR injection Inject 100 mg IM in case of emergency or if can't take oral Hydoecortisone 1 each 1    hydrocortisone sodium succinate  MG SOLR injection Use 100 mg intramuscular injection if unable to tolerate oral HydroCortisone  ---pharmacy plz give appropriate needle and syringe 1 each 1    benzocaine (ORAJEL) 10 % mucosal gel Take by mouth as needed. 9 g 1    DULERA 100-5 MCG/ACT inhaler Inhale 2 puffs into the lungs 2 times daily      albuterol sulfate  (90 Base) MCG/ACT inhaler Inhale 2 puffs into the lungs every 4 hours as needed for Shortness of Breath       No current facility-administered medications for this visit.